# Patient Record
Sex: MALE | Race: WHITE | Employment: FULL TIME | URBAN - METROPOLITAN AREA
[De-identification: names, ages, dates, MRNs, and addresses within clinical notes are randomized per-mention and may not be internally consistent; named-entity substitution may affect disease eponyms.]

---

## 2018-01-03 ENCOUNTER — OFFICE VISIT (OUTPATIENT)
Dept: ORTHOPEDIC SURGERY | Age: 63
End: 2018-01-03

## 2018-01-03 VITALS — RESPIRATION RATE: 16 BRPM | HEIGHT: 68 IN | BODY MASS INDEX: 27.13 KG/M2 | WEIGHT: 179 LBS

## 2018-01-03 DIAGNOSIS — R52 PAIN: ICD-10-CM

## 2018-01-03 DIAGNOSIS — Z96.641 STATUS POST TOTAL REPLACEMENT OF RIGHT HIP: ICD-10-CM

## 2018-01-03 DIAGNOSIS — Z96.642 STATUS POST TOTAL REPLACEMENT OF LEFT HIP: ICD-10-CM

## 2018-01-03 DIAGNOSIS — Z96.643 STATUS POST TOTAL REPLACEMENT OF BOTH HIPS: Primary | ICD-10-CM

## 2018-01-03 PROBLEM — M19.90 ARTHRITIS: Status: ACTIVE | Noted: 2018-01-03

## 2018-01-03 PROCEDURE — G8427 DOCREV CUR MEDS BY ELIG CLIN: HCPCS | Performed by: ORTHOPAEDIC SURGERY

## 2018-01-03 PROCEDURE — 1036F TOBACCO NON-USER: CPT | Performed by: ORTHOPAEDIC SURGERY

## 2018-01-03 PROCEDURE — G8419 CALC BMI OUT NRM PARAM NOF/U: HCPCS | Performed by: ORTHOPAEDIC SURGERY

## 2018-01-03 PROCEDURE — G8484 FLU IMMUNIZE NO ADMIN: HCPCS | Performed by: ORTHOPAEDIC SURGERY

## 2018-01-03 PROCEDURE — 3017F COLORECTAL CA SCREEN DOC REV: CPT | Performed by: ORTHOPAEDIC SURGERY

## 2018-01-03 PROCEDURE — 99213 OFFICE O/P EST LOW 20 MIN: CPT | Performed by: ORTHOPAEDIC SURGERY

## 2018-01-03 NOTE — PROGRESS NOTES
Passive Hip Internal Rotation:  []AROM = PROM   Passive Hip External Rotation:  []AROM = PROM     Motor Function:  [x] No gross motor weakness of hip [x] No gross motor weakness of knee  [x] No gross motor weakness of ankle    [x] No gross motor weakness of great toe  [] Motor weakness:     Neurologic:  [x] Sensation to light touch intact. [] Impaired:  [x] Deep tendon reflexes intact. [] Impaired:  [x] Coordination / proprioception intact. [] Impaired:     Palpation: (Not tested if not marked)     Normal Tenderness Swelling Crepitation Calor   Greater Trochanter: [x] [] [] [] []   Piriformis: [x] [] [] [] []   Sacro-Iliac Joint: [x] [] [] [] []   ASIS [x] [] [] [] []   Ischial Tuberosity [x] [] [] [] []   Proximal Quadriceps: [x] [] [] [] []   Symphysis Pubis: [x] [] [] [] []   Other: [] [] [] [] []     Comment:     Provocative Tests: (Not tested if not marked)   Negative Positive Positive Findings   Straight Leg Raise: [x] []    Stinchfield Test: [x] []    [place] Test: [x] []    Piriformis Test: [x] []    Bobby Test: [x] []    BRE: [x] []    FADIR: [x] []    Russell Test: [x] []    90-90 St. Leg (Hamstrings): [x] []    Other: [] []    Other: [] []        MEDICAL DATA:     Imaging:  Hip x-ray:  2 view(s) of the right hip were obtained and reviewed and show a total hip arthroplasty in acceptable alignment without signs of hardware complication. No associated fractures, dislocations, or subluxations. Pelvis x-ray:  AP view of the pelvis is obtained and reviewed and show bilateral total hip arthroplasty in acceptable alignment without signs of hardware complication. No associated fractures, dislocations, or subluxations. Leg lengths are equal as measured at the level of the lesser trochanter. Hip x-ray:  2 view(s) of the left hip were obtained and reviewed and show a total hip arthroplasty in acceptable alignment without signs of hardware complication.   No associated fractures, dislocations, or

## 2018-01-03 NOTE — PATIENT INSTRUCTIONS
Apply heat as needed   Work on home stretching   Follow up in 2 years for bilateral RENE check with x-rays

## 2018-06-13 ENCOUNTER — OFFICE VISIT (OUTPATIENT)
Dept: FAMILY MEDICINE CLINIC | Age: 63
End: 2018-06-13

## 2018-06-13 VITALS
OXYGEN SATURATION: 93 % | SYSTOLIC BLOOD PRESSURE: 118 MMHG | BODY MASS INDEX: 26.66 KG/M2 | HEART RATE: 73 BPM | DIASTOLIC BLOOD PRESSURE: 60 MMHG | WEIGHT: 180 LBS | HEIGHT: 69 IN

## 2018-06-13 DIAGNOSIS — K21.9 GASTROESOPHAGEAL REFLUX DISEASE WITHOUT ESOPHAGITIS: Primary | ICD-10-CM

## 2018-06-13 DIAGNOSIS — Z96.643 STATUS POST TOTAL REPLACEMENT OF BOTH HIPS: ICD-10-CM

## 2018-06-13 DIAGNOSIS — M54.41 ACUTE LOW BACK PAIN WITH RIGHT-SIDED SCIATICA, UNSPECIFIED BACK PAIN LATERALITY: ICD-10-CM

## 2018-06-13 DIAGNOSIS — R05.9 COUGH: ICD-10-CM

## 2018-06-13 DIAGNOSIS — Z12.11 COLON CANCER SCREENING: ICD-10-CM

## 2018-06-13 DIAGNOSIS — R06.02 SHORTNESS OF BREATH: ICD-10-CM

## 2018-06-13 PROCEDURE — G8419 CALC BMI OUT NRM PARAM NOF/U: HCPCS | Performed by: FAMILY MEDICINE

## 2018-06-13 PROCEDURE — 1036F TOBACCO NON-USER: CPT | Performed by: FAMILY MEDICINE

## 2018-06-13 PROCEDURE — 99204 OFFICE O/P NEW MOD 45 MIN: CPT | Performed by: FAMILY MEDICINE

## 2018-06-13 PROCEDURE — G8427 DOCREV CUR MEDS BY ELIG CLIN: HCPCS | Performed by: FAMILY MEDICINE

## 2018-06-13 PROCEDURE — 3017F COLORECTAL CA SCREEN DOC REV: CPT | Performed by: FAMILY MEDICINE

## 2018-06-13 RX ORDER — NAPROXEN 500 MG/1
500 TABLET ORAL 2 TIMES DAILY WITH MEALS
Qty: 40 TABLET | Refills: 0 | Status: ON HOLD | OUTPATIENT
Start: 2018-06-13 | End: 2018-06-30 | Stop reason: HOSPADM

## 2018-06-13 RX ORDER — TIZANIDINE 4 MG/1
4 TABLET ORAL 2 TIMES DAILY PRN
Qty: 60 TABLET | Refills: 0 | Status: SHIPPED | OUTPATIENT
Start: 2018-06-13

## 2018-06-13 ASSESSMENT — ENCOUNTER SYMPTOMS
ABDOMINAL PAIN: 0
BACK PAIN: 1
BOWEL INCONTINENCE: 0

## 2018-06-13 ASSESSMENT — PATIENT HEALTH QUESTIONNAIRE - PHQ9
1. LITTLE INTEREST OR PLEASURE IN DOING THINGS: 0
SUM OF ALL RESPONSES TO PHQ9 QUESTIONS 1 & 2: 0
SUM OF ALL RESPONSES TO PHQ QUESTIONS 1-9: 0
2. FEELING DOWN, DEPRESSED OR HOPELESS: 0

## 2018-06-17 ASSESSMENT — ENCOUNTER SYMPTOMS
STRIDOR: 0
SINUS PRESSURE: 0
TROUBLE SWALLOWING: 0
COUGH: 0
WHEEZING: 0
RHINORRHEA: 0
NAUSEA: 0
VOMITING: 0
SHORTNESS OF BREATH: 0
APNEA: 0
DIARRHEA: 0
BLOOD IN STOOL: 0
SORE THROAT: 0
CONSTIPATION: 0

## 2018-06-18 ENCOUNTER — HOSPITAL ENCOUNTER (OUTPATIENT)
Dept: PULMONOLOGY | Age: 63
Discharge: HOME | End: 2018-06-18
Attending: FAMILY MEDICINE | Admitting: FAMILY MEDICINE

## 2018-06-25 ENCOUNTER — HOSPITAL ENCOUNTER (OUTPATIENT)
Dept: PHYSICAL THERAPY | Age: 63
Discharge: HOME | End: 2018-06-30
Attending: FAMILY MEDICINE | Admitting: FAMILY MEDICINE

## 2018-06-25 NOTE — PLAN OF CARE
Outpatient Physical Therapy        [x] Phone: [phone]   Fax: [phone]   Pediatric Therapy          [] Phone: [phone]   Fax: [phone]  [facility]          [] Phone: [phone]   Fax: [phone]      To: Referring Practitioner: Meredith Meigs, MD  From: Vanessa Candelario, GASTON     Patient: Jeronimo Vasquez       : 1955  Diagnosis: Acute LBP with right sciatica   Treatment Diagnosis: Muscular weakness, decreased function, pain   Date: 2018    Physical Therapy Certification/Re-Certification Form  Dear Dima Kirkland MD  The following patient has been evaluated for physical therapy services and for therapy to continue, Please review the attached evaluation and/or summary of the patient's plan of care, and verify that you agree therapy should continue by signing the attached document and sending it back to our office.     Fabien Cornejo is a  64 yo Male who presents with Acute LBP with right sciatica which impacts on gait, endurance, standing, sitting;patient's goal is to reduce back pain and improve function; PT to address patient's goals, impairments and activity limitations with skilled interventions checked in plan of care;patient's level of function prior to onset of Acute LBP with right sciatica was [facility]; did not observe any barriers to learning during PT eval; learning preferences include demonstration, practice, and handouts; patient expressed understanding of HEP; patient appears to be motivated to participate in an active PT program and to be compliant with HEP expectations;patient assisted in developing treatment plan and goals; no DME is currently being used      Planned Services:  [x] Therapeutic Exercise    [] Aquatics:  [x] Therapeutic Activity    [] Ultrasound  [x] Elec Stimulation  [x] Gait Training     [] Cervical Traction [] Lumbar Traction  [x] Neuromuscular Re-education [x] Cold/hotpack [] Iontophoresis   [x] Instruction in HEP       [x] Manual Therapy     []

## 2018-06-25 NOTE — PROGRESS NOTES
Physical Therapy  Initial Assessment  Date: 2018  Patient Name: Tasha Alvarez  MRN: [id number]  : 1955     Treatment Diagnosis: Muscular weakness, decreased function, pain    Restrictions   [facility]    Subjective   General  Chart Reviewed: Yes  Patient assessed for rehabilitation services?: Yes  Family / Caregiver Present: No  Referring Practitioner: Cyrus Hewitt MD  Referral Date : 18  Diagnosis: Acute LBP with right sciatica  Follows Commands: Within Functional Limits  PT Visit Information  PT Insurance Information: [address]  Subjective  Subjective: Pt states his back pain has been ongoing for about 1 year. Pt states it has been feeling like it gets pinched or twisted ever since and he was recommended to a chiropractor but his insurance doesn't cover it. Pt states he has been having increasing pain in the low back to daily. It is dependent on his activities. Pt is not currently having pain. Vision/Hearing  Vision  Vision: Impaired (wears glasses)  Hearing  Hearing: Exceptions to [facility]  Hearing Exceptions: Hard of hearing/hearing concerns    Orientation  Orientation  Overall Orientation Status: Within Normal Limits    Social/Functional History  Social/Functional History  Active : Yes  Mode of Transportation: Truck  Occupation: Unemployed  Type of occupation: Pt has been disabled a lot lately due to his hips, low back problems and breathing issues. Objective     Observation/Palpation  Posture: Fair  Palpation: Tenderness noted in the mendoza lumbar paraspinals, PSIS regions (all right > left)    Spine  Lumbar: Flexion 61 Extension 25 SB L 25 R 25    Strength RLE  Comment: hip flex 4-/5 abd 3/5 add 3/5; knee flex 3/5 ext 4/5; Ankle DF 5/5  Strength LLE  Comment: hip flex 4-/5 abd 3/5 add 3/5; knee flex 3/5 ext 4/5;  Ankle DF 5/5     Additional Measures  Flexibility: HS flexibility is WFL                    Assessment  Patient is a  66 yo Male who presents with Acute LBP with right sciatica which impacts on gait, endurance, standing, sitting;patient's goal is to reduce back pain and improve function; PT to address patient's goals, impairments and activity limitations with skilled interventions checked in plan of care;patient's level of function prior to onset of Acute LBP with right sciatica was [facility]; did not observe any barriers to learning during PT eval; learning preferences include demonstration, practice, and handouts; patient expressed understanding of HEP; patient appears to be motivated to participate in an active PT program and to be compliant with HEP expectations;patient assisted in developing treatment plan and goals; no DME is currently being used;      Current functional level (based on Oswestry page 1) 9 out of 25/36%    Conditions Requiring Skilled Therapeutic Intervention  Treatment Diagnosis: Muscular weakness, decreased function, pain  Prognosis: Fair  Decision Making: Medium Complexity  Exam: ROM, strength, palpation, Oswestry  Clinical Presentation: evolving  Patient Education: HEP  Barriers to Learning: None noted  REQUIRES PT FOLLOW UP: Yes  Treatment Initiated : yes         Plan   Plan  Times per week: 2x's/wk  Plan weeks: 5 weeks  Current Treatment Recommendations: Strengthening, ROM, Endurance Training, Manual Therapy - Soft Tissue Mobilization, Neuromuscular Re-education, Pain Management, Patient/Caregiver Education & Training, Safety Education & Training, Home Exercise Program, Modalities    G-Code  PT G-Codes  Functional Assessment Tool Used: Oswestry  Score: 9 out of 25/36%  Functional Limitation: Mobility: Walking and moving around  Mobility: Walking and Moving Around Current Status (): At least 20 percent but less than 40 percent impaired, limited or restricted  Mobility: Walking and Moving Around Goal Status (): At least 1 percent but less than 20 percent impaired, limited or restricted      Goals  Long term goals  Time Frame for Long term goals :  All

## 2018-06-28 PROBLEM — S82.202A: Status: ACTIVE | Noted: 2018-06-28

## 2018-06-29 PROBLEM — S82.232B: Status: ACTIVE | Noted: 2018-06-28

## 2018-06-30 PROBLEM — M19.90 ARTHRITIS: Status: RESOLVED | Noted: 2018-01-03 | Resolved: 2018-06-30

## 2018-07-01 ENCOUNTER — HOSPITAL ENCOUNTER (OUTPATIENT)
Dept: OTHER | Age: 63
Discharge: HOME | End: 2018-07-31
Attending: FAMILY MEDICINE | Admitting: FAMILY MEDICINE

## 2018-07-01 ENCOUNTER — CARE COORDINATION (OUTPATIENT)
Dept: CASE MANAGEMENT | Age: 63
End: 2018-07-01

## 2018-07-01 DIAGNOSIS — S82.232B TYPE I OR II OPEN DISPLACED OBLIQUE FRACTURE OF SHAFT OF LEFT TIBIA, INITIAL ENCOUNTER: Primary | ICD-10-CM

## 2018-07-01 PROCEDURE — 1111F DSCHRG MED/CURRENT MED MERGE: CPT | Performed by: FAMILY MEDICINE

## 2018-07-01 NOTE — CARE COORDINATION
[facility] Transitions Initial Follow Up Call    Call within 2 business days of discharge: Yes    Patient: Jose Ornelas Patient : 1955   MRN: <[id number]>  Reason for Admission: L tibial Fx-open  Discharge Date: 18 RARS: Readmission Risk Score: 9     Spoke with: [facility],[address]: [place]    Non-face-to-face services provided:  Obtained and reviewed discharge summary and/or continuity of care documents  Education of patient/family/caregiver/guardian to support self-management-Pain mgmt-meds,ice, elevate  Assessment and support for treatment adherence and medication management-med rec 1111F completed    Care Transitions 24 Hour Call    Do you have any ongoing symptoms?:  Yes  Patient-reported symptoms:  Pain (Comment: .-9/10)  Interventions for patient-reported symptoms:  Other (Comment: pt calling Dr Madyson Plascencia tomorrow for f/u and to discuss pain.)  Do you have a copy of your discharge instructions?:  Yes  Do you have all of your prescriptions and are they filled?:  Yes  Have you been contacted by a [facility] Pharmacist?:  No  Have you scheduled your follow up appointment?:  No (Comment: Pt calling Dr Madyson Plascencia tomorrow Monday for f/u appt)  Were you discharged with any Home Care or Post Acute Services:  No  Care Transitions Interventions     Care Transition post hospital f/u call to pt. He said he is having \"alot of pain in his L leg mostly at the knee- 8.-9/10. Taking Tramadol and tylenol as ordered. Discuss elevation and ice and he is doing that. States not much swelling. Review  mg 2 times day to prevent blood clots and he is taking. Med rec 9646I completed without discrepancy. Pt plans to call Dr Madyson Plascencia tomorrow as instructed to setup appt. He plans to discuss pain management also. Pt has PT visits noted on appt schedule but he said that was for his back and he will cancel those.      Follow Up  Future Appointments  Date Time Provider Rosemary Soares   7/3/2018 4:15 PM Imtiaz Serna

## 2018-07-02 ENCOUNTER — CARE COORDINATION (OUTPATIENT)
Dept: CASE MANAGEMENT | Age: 63
End: 2018-07-02

## 2018-07-02 ENCOUNTER — OFFICE VISIT (OUTPATIENT)
Dept: ORTHOPEDIC SURGERY | Age: 63
End: 2018-07-02

## 2018-07-02 VITALS — BODY MASS INDEX: 27.25 KG/M2 | RESPIRATION RATE: 16 BRPM | WEIGHT: 184 LBS | HEIGHT: 69 IN

## 2018-07-02 DIAGNOSIS — S82.232B TYPE I OR II OPEN DISPLACED OBLIQUE FRACTURE OF SHAFT OF LEFT TIBIA, INITIAL ENCOUNTER: Primary | ICD-10-CM

## 2018-07-02 PROCEDURE — 99024 POSTOP FOLLOW-UP VISIT: CPT | Performed by: ORTHOPAEDIC SURGERY

## 2018-07-02 NOTE — CARE COORDINATION
[facility] Transitions Follow Up Call    2018    Patient: Gustavo Rosa  Patient : 1955   MRN: [id number]  Reason for Admission: There are no discharge diagnoses documented for the most recent discharge. Discharge Date: 18 RARS: Readmission Risk Score: 9       Spoke with:  patient    Care Transitions Subsequent and Final Call    Subsequent and Final Calls  Care Transitions Interventions  Other Interventions: Follow Up:  Spoke with patient. Reports that he is still having pain. Denies fever, but reports some swelling to incision site. Patient reports that he is currently in the waiting room for follow up with his surgeon. Encouraged patient to inform physician of pain status. Patient voiced consent. Denies any needs at present. Agreeable to continued Care Transition.    Future Appointments  Date Time Provider Rosemary Soares   2018 3:00 PM Andressa Esquivel DO [facility]       Karthik Delgado RN

## 2018-07-02 NOTE — PROGRESS NOTES
Scribe Authentication Statement  Alexi Tabares scribed portions of this documentation for and in the presence of Dr. Aurea Sandoval DO on 7/2/18 at 3:35 PM.  Provider Authentication Statement:  I, Alexi Cheatham DO, personally performed the services described in this documentation and they were scribed in my presence by the above listed scribe. The documentation is both accurate and complete. ORTHOPEDIC FOLLOW UP AFTER SURGERY    HISTORY OF PRESENT ILLNESS (HPI):   Amador Person is a 65y.o. year old male who is here for follow up of:  1. Type I or II open displaced oblique fracture of shaft of left tibia, initial encounter        Procedure Name: OPERATION PERFORMED:  1. Irrigation and debridement of open left tibia shaft fracture including  skin and subcutaneous tissue (70459). 2.  Open treatment of the left tibial shaft fracture with intramedullary  nail implant (63281). Surgery Date: June/29/2018   Post-Op Number: 3 day(s)   How has the problem changed since the last visit: gradually improving   How have the associated manifestations changed since the last visit: stiffness and swelling  waxing and waning   New associated manifestations are: none   Adverse effects or complications: none   Other Comments: Patient is here for a dressing change and wound check after his impatient stay and surgery. Current Outpatient Prescriptions   Medication Sig Dispense Refill    traMADol (ULTRAM) 50 MG tablet Take 1 tablet by mouth every 6 hours for 7 days. . 28 tablet 0    acetaminophen (TYLENOL) 500 MG tablet Take 2 tablets by mouth every 8 hours for 7 days 42 tablet 0    aspirin 325 MG EC tablet Take 1 tablet by mouth 2 times daily for 21 days 42 tablet 0    Multiple Vitamins-Minerals (THERAPEUTIC MULTIVITAMIN-MINERALS) tablet Take 1 tablet by mouth daily      VENTOLIN  (90 Base) MCG/ACT inhaler Inhale 2 puffs into the lungs 2 times daily       tiZANidine (ZANAFLEX) 4 MG tablet Take 1 tablet by mouth 2 times daily as needed (spasm) 60 tablet 0    famotidine (PEPCID) 20 MG tablet Take 20 mg by mouth 2 times daily Over The Counter      aspirin 325 MG tablet Take 1 tablet by mouth 2 times daily for 18 days 36 tablet 0    aspirin 325 MG EC tablet Take 1 tablet by mouth 2 times daily for 19 days 38 tablet 0     No current facility-administered medications for this visit. ORTHOPEDIC EXAM:     [] Left Upper Extremity [] Right Upper Extremity  [x] Left Lower Extremity [] Right Lower Extremity    Inspection:  Incision: [x]Healing well without significant drainage, dehiscence, or significant erythema. []Erythema greater than expected:  []Dehiscence:  []Drainage:   Circulation: [x] Warm, well perfused, good capillary refill  [] Cool  [] Sluggish cap refill  [] Other:   Skin: [x] Intact without discoloration  [] Pale  [] Erythematous  [] Ecchymotic   [] Maceration  [] Other:  [] Wound:   Cast/Splint: [x] None  [] Intact, dry, and functional without any unexpected wear  [] Poorly fitting  [] Wet  [] Foul smelling  [] Broken  [] Patient altered  [] Foreign body within  [] Soft  [] Other:     Palpation:  Tenderness: [] None  [x] No more than expected  [] Pertinent positives & negatives:   Swelling: [] None  [x] No more than expected  [] Pertinent positives & negatives:   Calor (Heat) [x] None  [] No more than expected  [] Location:   Other Findings: -     Range of Motion:  [x] ROM deferred due to recent surgery   Active ROM: [] Full functional  [] Measured =   [] Mildly limited  [] Moderately limited  [] Severely limited   Passive ROM: [] Full functional  [] Measured =  [] Mildly limited  [] Moderately limited  [] Severely limited     Motor Function:  [x]Intact. No focal motor deficits. []Impaired:       Sensation:  Sensation to light touch: [x] Intact  [] Diminished:  [] Absent:       MEDICAL DATA:   Imaging:  No x-rays taken in the office today. ASSESSMENT / PLAN:      Diagnosis Orders   1.  Type I or II open displaced oblique fracture of shaft of left tibia, initial encounter         Continue non weight bearing left leg  Maintain dressings  Ice, elevate and rest  Follow up Friday        Electronically signed by Vinay Bustos on 7/2/2018 at 3:39 PM

## 2018-07-06 ENCOUNTER — CARE COORDINATION (OUTPATIENT)
Dept: CASE MANAGEMENT | Age: 63
End: 2018-07-06

## 2018-07-06 ENCOUNTER — OFFICE VISIT (OUTPATIENT)
Dept: ORTHOPEDIC SURGERY | Age: 63
End: 2018-07-06

## 2018-07-06 VITALS — HEART RATE: 88 BPM | OXYGEN SATURATION: 98 % | TEMPERATURE: 98.1 F

## 2018-07-06 DIAGNOSIS — Z09 POSTOP CHECK: ICD-10-CM

## 2018-07-06 DIAGNOSIS — S82.235B: ICD-10-CM

## 2018-07-06 DIAGNOSIS — S82.232B TYPE I OR II OPEN DISPLACED OBLIQUE FRACTURE OF SHAFT OF LEFT TIBIA, INITIAL ENCOUNTER: Primary | ICD-10-CM

## 2018-07-06 PROCEDURE — 99024 POSTOP FOLLOW-UP VISIT: CPT | Performed by: ORTHOPAEDIC SURGERY

## 2018-07-06 RX ORDER — TRAMADOL HYDROCHLORIDE 50 MG/1
50 TABLET ORAL EVERY 6 HOURS
Qty: 28 TABLET | Refills: 0 | Status: SHIPPED | OUTPATIENT
Start: 2018-07-06 | End: 2018-07-13 | Stop reason: SDUPTHER

## 2018-07-06 NOTE — PROGRESS NOTES
limited     Motor Function:  [x]Intact. No focal motor deficits. []Impaired:       Sensation:  Sensation to light touch: [x] Intact  [] Diminished:  [] Absent:       MEDICAL DATA:   Imaging:  No x-rays taken in the office today. ASSESSMENT / PLAN:      Diagnosis Orders   1. Type I or II open displaced oblique fracture of shaft of left tibia, initial encounter  XR TIBIA FIBULA LEFT (2 VIEWS)   2. Postop check       The increased swelling, erythema and slight serosanguinous drainage is likely from the patient leaving the limb in a dependant position and over activity. Localized wound care and strict elevation is emphasized.   New dressings applied  Maintain clean dry dressings, supplies provided   Keep incisions clean and dry  Continue non weight bearing left leg  Strict elevation of left leg above heart  Ice, elevate and rest  Ultram as needed, refilled  Follow up as scheduled 11 July for suture removal and xrays         Electronically signed by Vale Coffey MA on 7/6/2018 at 10:51 AM

## 2018-07-09 ENCOUNTER — TELEPHONE (OUTPATIENT)
Dept: ORTHOPEDIC SURGERY | Age: 63
End: 2018-07-09

## 2018-07-09 NOTE — TELEPHONE ENCOUNTER
I called him back. Advised him that he could leave open to air for 20 - 30 minutes during bandage changes. Also reminded him to keep the area elevated above the level of the heart at all practical times.

## 2018-07-11 ENCOUNTER — OFFICE VISIT (OUTPATIENT)
Dept: ORTHOPEDIC SURGERY | Age: 63
End: 2018-07-11

## 2018-07-11 DIAGNOSIS — S82.232B TYPE I OR II OPEN DISPLACED OBLIQUE FRACTURE OF SHAFT OF LEFT TIBIA, INITIAL ENCOUNTER: ICD-10-CM

## 2018-07-11 DIAGNOSIS — R52 PAIN: ICD-10-CM

## 2018-07-11 DIAGNOSIS — Z09 POSTOP CHECK: Primary | ICD-10-CM

## 2018-07-11 DIAGNOSIS — S82.235B: ICD-10-CM

## 2018-07-11 PROCEDURE — 99024 POSTOP FOLLOW-UP VISIT: CPT | Performed by: ORTHOPAEDIC SURGERY

## 2018-07-11 NOTE — PROGRESS NOTES
Scribe Authentication Statement  Rojas Love, scribed portions of this documentation for and in the presence of Dr. Nuzhat Nassar DO on 7/11/18 at 1:46 PM.  Provider Authentication Statement:  I, Ivy Kat DO, personally performed the services described in this documentation and they were scribed in my presence by the above listed scribe. The documentation is both accurate and complete. ORTHOPEDIC FOLLOW UP AFTER SURGERY    HISTORY OF PRESENT ILLNESS (HPI):   Yoselin Cochran is a 65y.o. year old male who is here for follow up of:  1. Postop check    2. Pain    3. Type I or II open displaced oblique fracture of shaft of left tibia, initial encounter    4. Type I or II open nondisplaced oblique fracture of shaft of left tibia, initial encounter        Procedure Name: OPERATION PERFORMED:  1. Irrigation and debridement of open left tibia shaft fracture including  skin and subcutaneous tissue (58329). 2.  Open treatment of the left tibial shaft fracture with intramedullary  nail implant (34445). Surgery Date: June/29/2018   Post-Op Number: 1.5 weeks    How has the problem changed since the last visit: In moderate pain today   How have the associated manifestations changed since the last visit: stiffness and swelling  waxing and waning   New associated manifestations are: Swelling    Adverse effects or complications: none   Other Comments: Patient is here for a dressing change and wound check. Current Outpatient Prescriptions   Medication Sig Dispense Refill    traMADol (ULTRAM) 50 MG tablet Take 1 tablet by mouth every 6 hours for 7 days. . 28 tablet 0    acetaminophen (TYLENOL) 500 MG tablet Take 2 tablets by mouth every 8 hours for 7 days 42 tablet 0    aspirin 325 MG EC tablet Take 1 tablet by mouth 2 times daily for 21 days 42 tablet 0    Multiple Vitamins-Minerals (THERAPEUTIC MULTIVITAMIN-MINERALS) tablet Take 1 tablet by mouth daily      VENTOLIN  (90 Base) MCG/ACT inhaler Inhale 2 puffs into the lungs 2 times daily       tiZANidine (ZANAFLEX) 4 MG tablet Take 1 tablet by mouth 2 times daily as needed (spasm) 60 tablet 0    aspirin 325 MG tablet Take 1 tablet by mouth 2 times daily for 18 days 36 tablet 0    aspirin 325 MG EC tablet Take 1 tablet by mouth 2 times daily for 19 days 38 tablet 0    famotidine (PEPCID) 20 MG tablet Take 20 mg by mouth 2 times daily Over The Counter       No current facility-administered medications for this visit. ORTHOPEDIC EXAM:     [] Left Upper Extremity [] Right Upper Extremity  [x] Left Lower Extremity [] Right Lower Extremity    Inspection:  Incision: [x]Healing well without significant drainage, dehiscence, or significant erythema. [x]Erythema greater than expected:  Mild erythema over open fracture site that improves with elevation of the limb above the level of the heart. Surgical wounds are without erythema. []Dehiscence:  []Drainage:  None. Circulation: [x] Warm, well perfused, good capillary refill  [] Cool  [] Sluggish cap refill  [] Other:   Skin: [x] Intact without discoloration  [] Pale  [] Erythematous  [] Ecchymotic   [] Maceration  [] Other:  [] Wound:   Cast/Splint: [x] None  [] Intact, dry, and functional without any unexpected wear  [] Poorly fitting  [] Wet  [] Foul smelling  [] Broken  [] Patient altered  [] Foreign body within  [] Soft  [] Other:     Palpation:  Tenderness: [] None  [x] No more than expected  [] Pertinent positives & negatives:   Swelling: [] None  [] No more than expected  [x] Pertinent positives & negatives:  Much improved edema in the foot, ankle, and lower leg.    Calor (Heat) [x] None  [] No more than expected  [] Location:   Other Findings: -     Range of Motion:  [x] ROM deferred due to recent surgery   Active ROM: [] Full functional  [] Measured =   [] Mildly limited  [] Moderately limited  [] Severely limited   Passive ROM: [] Full functional  [] Measured =  [] Mildly limited  [] Moderately limited  [] Severely limited     Motor Function:  [x]Intact. No focal motor deficits. []Impaired:       Sensation:  Sensation to light touch: [x] Intact  [] Diminished:  [] Absent:       MEDICAL DATA:   Imaging:  Tib-Fib x-ray:  2 view(s) of the left tibia and fibula were obtained and reviewed and show a nondisplaced non-angulated fracture of the distal shaft of the tibia. This fracture is at the junction of the middle and distal third. There is an intramedullary ina that is maintaining fracture alignment. No healing callus is visible at the fracture site. ASSESSMENT / PLAN:      Diagnosis Orders   1. Postop check     2. Pain  XR TIBIA FIBULA LEFT (2 VIEWS)   3. Type I or II open displaced oblique fracture of shaft of left tibia, initial encounter     4. Type I or II open nondisplaced oblique fracture of shaft of left tibia, initial encounter        Localized wound care and strict elevation is emphasized.   New dressings applied  Maintain clean dry dressings  Keep incisions clean and dry  Continue non weight bearing left leg  Strict elevation of left leg above heart  Ice and rest  Follow up Friday 13 July, in [place] for suture removal, no xrays at that visit         Electronically signed by Ligia Cruz DO on 7/11/2018 at 2:14 PM

## 2018-07-12 NOTE — PROGRESS NOTES
Scribe Authentication Statement  Jennie Leos, scribed portions of this documentation for and in the presence of Dr. Iris Nugent DO on 7/13/18 at 11:45 AM.    Provider Authentication Statement:  IDinora DO, personally performed the services described in this documentation and they were scribed in my presence by the above listed scribe. The documentation is both accurate and complete. ORTHOPEDIC FOLLOW UP AFTER SURGERY    HISTORY OF PRESENT ILLNESS (HPI):   Mary Beth Omalley is a 65y.o. year old male who is here for follow up of: Status Post IM Nail left tibia  1. Type I or II open nondisplaced oblique fracture of shaft of left tibia, initial encounter    2. Type I or II open displaced oblique fracture of shaft of left tibia, initial encounter        Procedure Name: IM nail of left tibia   Surgery Date: June/30/2018   Post-Op Number: 2 week(s)   How has the problem changed since the last visit: gradually improving   How have the associated manifestations changed since the last visit: . Juan Earing New associated manifestations are: none   Adverse effects or complications: None   Other Comments:       Current Outpatient Prescriptions   Medication Sig Dispense Refill    traMADol (ULTRAM) 50 MG tablet Take 1 tablet by mouth every 6 hours for 7 days. . 28 tablet 0    acetaminophen (TYLENOL) 500 MG tablet Take 2 tablets by mouth every 8 hours for 7 days 42 tablet 0    aspirin 325 MG EC tablet Take 1 tablet by mouth 2 times daily for 21 days 42 tablet 0    Multiple Vitamins-Minerals (THERAPEUTIC MULTIVITAMIN-MINERALS) tablet Take 1 tablet by mouth daily      VENTOLIN  (90 Base) MCG/ACT inhaler Inhale 2 puffs into the lungs 2 times daily       tiZANidine (ZANAFLEX) 4 MG tablet Take 1 tablet by mouth 2 times daily as needed (spasm) 60 tablet 0    aspirin 325 MG tablet Take 1 tablet by mouth 2 times daily for 18 days 36 tablet 0    aspirin 325 MG EC tablet Take 1 tablet by mouth 2 times daily for 19 days 38 encounter  traMADol (ULTRAM) 50 MG tablet   2. Type I or II open displaced oblique fracture of shaft of left tibia, initial encounter  traMADol (ULTRAM) 50 MG tablet     Patient will follow up in 2 weeks for post op with x-ray in [facility] office. Patient will call if there are any concerns.       Electronically signed by Cora Reid DO on 7/13/2018 at 11:46 AM

## 2018-07-13 ENCOUNTER — OFFICE VISIT (OUTPATIENT)
Dept: ORTHOPEDIC SURGERY | Age: 63
End: 2018-07-13

## 2018-07-13 DIAGNOSIS — S82.235B: ICD-10-CM

## 2018-07-13 DIAGNOSIS — S82.232B TYPE I OR II OPEN DISPLACED OBLIQUE FRACTURE OF SHAFT OF LEFT TIBIA, INITIAL ENCOUNTER: ICD-10-CM

## 2018-07-13 PROCEDURE — 99024 POSTOP FOLLOW-UP VISIT: CPT | Performed by: ORTHOPAEDIC SURGERY

## 2018-07-13 RX ORDER — TRAMADOL HYDROCHLORIDE 50 MG/1
50 TABLET ORAL EVERY 6 HOURS
Qty: 28 TABLET | Refills: 0 | Status: SHIPPED | OUTPATIENT
Start: 2018-07-13 | End: 2018-07-20

## 2018-07-30 ENCOUNTER — OFFICE VISIT (OUTPATIENT)
Dept: ORTHOPEDIC SURGERY | Age: 63
End: 2018-07-30

## 2018-07-30 DIAGNOSIS — S82.235B: ICD-10-CM

## 2018-07-30 DIAGNOSIS — Z09 POSTOP CHECK: Primary | ICD-10-CM

## 2018-07-30 DIAGNOSIS — S82.232B TYPE I OR II OPEN DISPLACED OBLIQUE FRACTURE OF SHAFT OF LEFT TIBIA, INITIAL ENCOUNTER: ICD-10-CM

## 2018-07-30 PROCEDURE — 99024 POSTOP FOLLOW-UP VISIT: CPT | Performed by: PHYSICIAN ASSISTANT

## 2018-07-30 NOTE — PROGRESS NOTES
Scribe Authentication Statement  Chino Bartholomew, scribed portions of this documentation for and in the presence of Sherlyn Knox PA-C on 7/30/18 at 1:36 PM.    Provider Authentication Statement  I, Sherlyn Knox PA-C, personally performed the services described in this documentation and they were scribed in my presence by the above listed scribe. The documentation is both accurate and complete. ORTHOPEDIC FOLLOW UP AFTER SURGERY    HISTORY OF PRESENT ILLNESS (HPI):   Teresa Wang is a 59y.o. year old male who is here for follow up of: Status Post IM Nail left tibia  1. Postop check    2. Type I or II open nondisplaced oblique fracture of shaft of left tibia, initial encounter    3.  Type I or II open displaced oblique fracture of shaft of left tibia, initial encounter        Procedure Name: IM nail of left tibia   Surgery Date: June/30/2018   Post-Op Number: 4 week(s)   How has the problem changed since the last visit: gradually improving   How have the associated manifestations changed since the last visit: .  .improving   New associated manifestations are: none   Adverse effects or complications: None   Other Comments:  is PWB with walker      Current Outpatient Prescriptions   Medication Sig Dispense Refill    acetaminophen (TYLENOL) 500 MG tablet Take 2 tablets by mouth every 8 hours for 7 days 42 tablet 0    aspirin 325 MG EC tablet Take 1 tablet by mouth 2 times daily for 21 days 42 tablet 0    Multiple Vitamins-Minerals (THERAPEUTIC MULTIVITAMIN-MINERALS) tablet Take 1 tablet by mouth daily      VENTOLIN  (90 Base) MCG/ACT inhaler Inhale 2 puffs into the lungs 2 times daily       tiZANidine (ZANAFLEX) 4 MG tablet Take 1 tablet by mouth 2 times daily as needed (spasm) 60 tablet 0    aspirin 325 MG tablet Take 1 tablet by mouth 2 times daily for 18 days 36 tablet 0    aspirin 325 MG EC tablet Take 1 tablet by mouth 2 times daily for 19 days 38 tablet 0    famotidine (PEPCID) 20 MG tablet Take

## 2018-07-30 NOTE — PATIENT INSTRUCTIONS
Continue PWB with walker   xrays ordered for imaging center   Start physical therapy   Elevate leg as much as possible   Transition to tylenol and NSAIDS for pain control   Follow up in 2 weeks     The patient was advised that NSAID-type medications have two very important potential side effects: gastrointestinal irritation including hemorrhage and renal injuries. He was asked to take the medication with food and to stop if he experiences any GI upset. I asked him to call for vomiting, abdominal pain or black/bloody stools. The patient expresses understanding of these issues and questions were answered.     Add Tylenol (also known as acetaminophen) as needed   Take 2 extra strength (500 mg) or 3 regular strength (325 mg) up to three times a day  It is OK to take Tylenol in conjunction with NSAID's (Advil, Aleve, Naprosyn, etc)  If taking other medications that have acetaminophen ensure total acetaminophen dose for any 24 period is less than 3,000 mg

## 2018-08-02 ENCOUNTER — HOSPITAL ENCOUNTER (OUTPATIENT)
Dept: PHYSICAL THERAPY | Age: 63
Discharge: HOME | End: 2018-08-31
Attending: PHYSICIAN ASSISTANT | Admitting: PHYSICIAN ASSISTANT

## 2018-08-02 ASSESSMENT — PAIN SCALES - GENERAL: PAINLEVEL_OUTOF10: 6

## 2018-08-02 ASSESSMENT — PAIN DESCRIPTION - LOCATION: LOCATION: LEG;TIBIA

## 2018-08-02 ASSESSMENT — PAIN DESCRIPTION - ORIENTATION: ORIENTATION: LEFT

## 2018-08-02 ASSESSMENT — PAIN DESCRIPTION - FREQUENCY: FREQUENCY: CONTINUOUS

## 2018-08-02 ASSESSMENT — PAIN DESCRIPTION - PAIN TYPE: TYPE: ACUTE PAIN;SURGICAL PAIN

## 2018-08-02 NOTE — FLOWSHEET NOTE
RE-ED                                     THER ACT                              GAIT                              MODALITIES                          HEP: As above, copy to pt/chart           Supervision/Cues:  Pt instructed to keep ex within tolerance. Objectives: See eval     Response to intervention:      Post Tx Pain Rating: Did not rate     Overall change since Evaluation:      Prognosis: FAIR     Plan for Next Session: Advance pt with ROM and strengthening as he tolerates.  (knee & ankle)       Plan:  _2_/week x _5_ weeks  [x] Continue per plan of care [] Alter current plan   [x] Plan of care initiated [] Hold pending MD visit [] Discharge    Intervention used today:  [x] Therapeutic Exercise    [x] Therapeutic Activity     [] Ultrasound  [] Elec  Stim  [] Gait Training      [] Cervical Traction [] Lumbar Traction  [] Neuromuscular Re-education    [] Cold/hotpack [] Iontophoresis   [x] Instruction in HEP      [] Vasopneumatic     [] Manual Therapy               [x] Self care home management                    (    ) Dry needling    Time In: 4453  Time Out: 1440  Timed Code Treatment Minutes: 23    Total Treatment Minutes: 54     Electronically signed by:  Johnsie Peabody 8/2/2018, 7:08 PM

## 2018-08-02 NOTE — PLAN OF CARE
Outpatient Physical Therapy        [x] Phone: [phone]   Fax: [phone]   Pediatric Therapy          [] Phone: [phone]   Fax: [phone]  [facility]          [] Phone: [phone]   Fax: [phone]      To: Referring Practitioner: Dennis Roberson PA-C  From: Marianne Early, PT     Patient: Lv Young       : 1955  Diagnosis: Left tibia nondisplaced Oblique Fracture   Treatment Diagnosis: Muscular weakness, difficulty walking, decreased function   Date: 2018    Physical Therapy Certification/Re-Certification Form  Dear Dr. Dennis Roberson PA-C  The following patient has been evaluated for physical therapy services and for therapy to continue, Please review the attached evaluation and/or summary of the patient's plan of care, and verify that you agree therapy should continue by signing the attached document and sending it back to our office.     Assessment: Patient is a 58 yo Male who presents with Left tibia nondisplaced Oblique Fracture which impacts on gait, endurance, movement, sleeping, standing, looking after his granddaughter;patient's goal is to increase ROM, strength and walk without walker; PT to address patient's goals, impairments and activity limitations with skilled interventions checked in plan of care;patient's level of function prior to onset of Left tibia nondisplaced Oblique Fracture was [facility]; did not observe any barriers to learning during PT eval; learning preferences include demonstration, practice, and handouts; patient expressed understanding of HEP; patient appears to be motivated to participate in an active PT program and to be compliant with HEP expectations;patient assisted in developing treatment plan and goals; Standard walker DME is currently being used      Planned Services:  [x] Therapeutic Exercise    [] Aquatics:  [x] Therapeutic Activity    [] Ultrasound  [x] Elec Stimulation  [x] Gait Training     [] Cervical Traction [] Lumbar Traction  [x] Neuromuscular Re-education [x] Cold/hotpack [] Iontophoresis   [x] Instruction in HEP       [x] Manual Therapy     [x] vasopneumatic            [x] Self care home management        []Dry needling trigger point point/pain management      Plan of Care Date Range:  8/2/2018 - 9/13/2018    ? Frequency/Duration:  # Days per week: [] 1 day # Weeks: [] 1 week [x] 5 weeks     [x] 2 days? [] 2 weeks [] 6 weeks     [] 3 days   [] 3 weeks [] 7 weeks     [] 4 days   [] 4 weeks [] 8 weeks    Rehab Potential: [] Excellent [] Good [x] Fair  [] Poor     Goals:     Long term goals  Time Frame for Long term goals : All  goals to be assessed by the 10th visit  Long term goal 1: Pt to be independent with HEP  Long term goal 2: Pt to increase left knee AROM  Long term goal 3: Pt to increase left knee strength  Long term goal 4: Pt to improve LEFS score    Electronically signed by:  Cherry Donato PT, 8/2/2018, 7:06 PM          If you have any questions or concerns, please don't hesitate to call.   Thank you for your referral.    Physician Signature:_________________Date:____________Time: ________  By signing above, therapists plan is approved by physician

## 2018-08-02 NOTE — PROGRESS NOTES
Physical Therapy  Initial Assessment  Date: 2018  Patient Name: Corie Contreras  MRN: [id number]  : 1955     Treatment Diagnosis: Muscular weakness, difficulty walking, decreased function    Restrictions   None noted    Subjective   General  Chart Reviewed: Yes  Patient assessed for rehabilitation services?: Yes  Family / Caregiver Present: No  Referring Practitioner: Vi Linder PA-C  Referral Date : 18  Diagnosis: Left tibia nondisplaced Oblique Fracture  PT Visit Information  Onset Date: 18  PT Insurance Information: [facility]  Subjective  Subjective: Pt states he was using a  and it blew up in front of him. Pt states there was a metal piece that flew off and hit him in the lower tibia causing a fracture. Pt was taken to ED and surgery was performed the next day. He has a ina down his entire left lower leg. He has 2 screws at the medial superior tibial and 2 screws at the inferior tibial, 1 longer incision longitudinal along the patella. Pt states the foot stays swollen and there is pain in the anterior lower leg to the point he can't touch it. Pt states he has been icing the knee frequently and trying to prop it up whenever he can. Pain Screening  Patient Currently in Pain: Yes  Pain Assessment  Pain Assessment: 0-10  Pain Level: 6  Pain Type: Acute pain;Surgical pain  Pain Location: Leg;Tibia  Pain Orientation: Left  Pain Descriptors: Sharp;Numbness; Aching; Throbbing  Pain Frequency: Continuous  Pain Intervention(s): Medication (see eMar); Cold applied  Vital Signs  Patient Currently in Pain: Yes    Vision/Hearing  Vision  Vision: Impaired (wears glasses)  Hearing  Hearing: Within functional limits    Orientation  Orientation  Overall Orientation Status: Within Normal Limits    Objective     Observation/Palpation  Posture: Fair  Palpation: Tenderness noted in entire left knee and entire anterior lower leg    AROM RLE (degrees)  RLE General AROM: Knee flex 120 ext 0

## 2018-08-13 ENCOUNTER — OFFICE VISIT (OUTPATIENT)
Dept: ORTHOPEDIC SURGERY | Age: 63
End: 2018-08-13

## 2018-08-13 DIAGNOSIS — Z09 POSTOP CHECK: Primary | ICD-10-CM

## 2018-08-13 DIAGNOSIS — S82.235B: ICD-10-CM

## 2018-08-13 DIAGNOSIS — R52 PAIN: ICD-10-CM

## 2018-08-13 PROCEDURE — 99024 POSTOP FOLLOW-UP VISIT: CPT | Performed by: PHYSICIAN ASSISTANT

## 2018-08-13 NOTE — PROGRESS NOTES
Scribe Authentication Statement  Gilberto Barr, scribed portions of this documentation for and in the presence of Deandra Olsen PA-C on 8/13/18 at 10:31 AM.  Provider Authentication Statement  I, Deandra Olsen PA-C, personally performed the services described in this documentation and they were scribed in my presence by the above listed scribe. The documentation is both accurate and complete. ORTHOPEDIC FOLLOW UP AFTER SURGERY    HISTORY OF PRESENT ILLNESS (HPI):   Mamadou Spears is a 59y.o. year old male who is here for follow up of: Status Post IM Nail left tibia  1. Postop check    2. Pain    3.  Type I or II open nondisplaced oblique fracture of shaft of left tibia, initial encounter        Procedure Name: IM nail of left tibia   Surgery Date: June/30/2018   Post-Op Number: 6 week(s)   How has the problem changed since the last visit: gradually improving   How have the associated manifestations changed since the last visit: .  .improving   New associated manifestations are: none   Adverse effects or complications: None   Other Comments:  is PWB with walker      Current Outpatient Prescriptions   Medication Sig Dispense Refill    acetaminophen (TYLENOL) 500 MG tablet Take 2 tablets by mouth every 8 hours for 7 days 42 tablet 0    aspirin 325 MG EC tablet Take 1 tablet by mouth 2 times daily for 21 days 42 tablet 0    Multiple Vitamins-Minerals (THERAPEUTIC MULTIVITAMIN-MINERALS) tablet Take 1 tablet by mouth daily      VENTOLIN  (90 Base) MCG/ACT inhaler Inhale 2 puffs into the lungs 2 times daily       tiZANidine (ZANAFLEX) 4 MG tablet Take 1 tablet by mouth 2 times daily as needed (spasm) 60 tablet 0    aspirin 325 MG tablet Take 1 tablet by mouth 2 times daily for 18 days 36 tablet 0    aspirin 325 MG EC tablet Take 1 tablet by mouth 2 times daily for 19 days 38 tablet 0    famotidine (PEPCID) 20 MG tablet Take 20 mg by mouth 2 times daily Over The Counter       No current facility-administered medications for this visit. ORTHOPEDIC EXAM:     [] Left Upper Extremity [] Right Upper Extremity  [x] Left Lower Extremity [] Right Lower Extremity    Inspection:  Incision: [x]Healing well without significant drainage, dehiscence, or significant erythema. []Erythema greater than expected:  []Dehiscence:  []Drainage:   Circulation: [x] Warm, well perfused, good capillary refill  [] Cool  [] Sluggish cap refill  [] Other:   Skin: [x] Intact without discoloration  [] Pale  [] Erythematous  [] Ecchymotic   [] Maceration  [] Other:  [] Wound:   Cast/Splint: [x] None  [] Intact, dry, and functional without any unexpected wear  [] Poorly fitting  [] Wet  [] Foul smelling  [] Broken  [] Patient altered  [] Foreign body within  [] Soft  [] Other:     Palpation:  Tenderness: [] None  [x] No more than expected  [] Pertinent positives & negatives: Pain over the medial malleolus and pain over the midshaft of the tibia    Swelling: [] None  [x] No more than expected  [] Pertinent positives & negatives:   Calor (Heat) [x] None  [] No more than expected  [] Location:   Other Findings: All incisions are healed and show no signs of infection. Range of Motion:  [] ROM deferred due to recent surgery   Active ROM: [] Full functional  [] Measured =   [] Mildly limited  [x] Moderately limited  [] Severely limited   Passive ROM: [] Full functional  [] Measured =  [] Mildly limited  [] Moderately limited  [] Severely limited     Motor Function:  [x]Intact. No focal motor deficits. []Impaired:       Sensation:  Sensation to light touch: [x] Intact  [] Diminished:  [] Absent:       MEDICAL DATA:   Imaging:  taken and reviewed  2 views of the left Tib-fib no new fractures or dislocations noted, stable fixation of a tibial shaft fracture with intramedullary nail fixation and no signs of complications with hardware.   The official read and interpretation of these x-rays will be done by the the [facility]     ASSESSMENT / PLAN:      Diagnosis Orders   1. Postop check     2.  Pain  XR TIBIA FIBULA LEFT (2 VIEWS)    XR TIBIA FIBULA LEFT (2 VIEWS)    XR TIBIA FIBULA LEFT (2 VIEWS)   3. Type I or II open nondisplaced oblique fracture of shaft of left tibia, initial encounter       Continue PWB with walker   Continue physical therapy   Elevate leg as much as possible   Transition to tylenol and NSAIDS for pain control   Follow up in 6 weeks with x-ray

## 2018-09-01 ENCOUNTER — HOSPITAL ENCOUNTER (OUTPATIENT)
Dept: OTHER | Age: 63
Discharge: HOME | End: 2018-09-01
Attending: PHYSICIAN ASSISTANT | Admitting: PHYSICIAN ASSISTANT

## 2018-09-24 ENCOUNTER — OFFICE VISIT (OUTPATIENT)
Dept: ORTHOPEDIC SURGERY | Age: 63
End: 2018-09-24
Payer: MEDICAID

## 2018-09-24 VITALS — WEIGHT: 184 LBS | HEIGHT: 69 IN | BODY MASS INDEX: 27.25 KG/M2 | RESPIRATION RATE: 16 BRPM

## 2018-09-24 DIAGNOSIS — R52 PAIN: ICD-10-CM

## 2018-09-24 DIAGNOSIS — Z09 POSTOP CHECK: ICD-10-CM

## 2018-09-24 DIAGNOSIS — S82.232B TYPE I OR II OPEN DISPLACED OBLIQUE FRACTURE OF SHAFT OF LEFT TIBIA, INITIAL ENCOUNTER: Primary | ICD-10-CM

## 2018-09-24 DIAGNOSIS — S82.235B: ICD-10-CM

## 2018-09-24 PROCEDURE — 73590 X-RAY EXAM OF LOWER LEG: CPT | Performed by: PHYSICIAN ASSISTANT

## 2018-09-24 PROCEDURE — 99024 POSTOP FOLLOW-UP VISIT: CPT | Performed by: PHYSICIAN ASSISTANT

## 2018-09-24 NOTE — LETTER
[facility]  [address] [place] [address]. [address] [place] [address]  Phone: [phone]  Fax: [phone]    Aleksandr Must        September 24, 2018     Patient: Valdez Schafer   YOB: 1955   Date of Visit: 9/24/2018       To Whom It May Concern: It is my medical opinion that Valdez Schafer requires a disability parking placard for the following reasons:  He cannot walk without assistance from another person or the use of an assistance device (cane, crutch, prosthetic device, wheelchair, etc.). Duration of need: 3 months    If you have any questions or concerns, please don't hesitate to call.     Sincerely,        Miguelito Hendrickson PA-C

## 2018-09-24 NOTE — PROGRESS NOTES
than expected:  []Dehiscence:  []Drainage:   Circulation: [x] Warm, well perfused, good capillary refill  [] Cool  [] Sluggish cap refill  [] Other:   Skin: [x] Intact without discoloration  [] Pale  [] Erythematous  [] Ecchymotic   [] Maceration  [] Other:  [] Wound:   Cast/Splint: [x] None  [] Intact, dry, and functional without any unexpected wear  [] Poorly fitting  [] Wet  [] Foul smelling  [] Broken  [] Patient altered  [] Foreign body within  [] Soft  [] Other:     Palpation:  Tenderness: [] None  [x] No more than expected  [] Pertinent positives & negatives: Pain over the medial malleolus and pain over the midshaft of the tibia    Swelling: [] None  [x] No more than expected  [] Pertinent positives & negatives:   Calor (Heat) [x] None  [] No more than expected  [] Location:   Other Findings: All incisions are healed and show no signs of infection. Range of Motion:  [] ROM deferred due to recent surgery   Active ROM: [] Full functional  [x] Measured = Ankle dorsiflexion 5°, plantar flexion 50°, eversion 10°, inversion 10°. [] Mildly limited  [] Moderately limited  [] Severely limited   Passive ROM: [] Full functional  [x] Measured =  [] Mildly limited  [] Moderately limited  [] Severely limited     Motor Function:  [x]Intact. No focal motor deficits. []Impaired:       Sensation:  Sensation to light touch: [x] Intact  [] Diminished:  [] Absent:       MEDICAL DATA:   Imaging:  taken and reviewed  2 views of the left Tib-fib no new fractures or dislocations noted, stable fixation of a tibial shaft fracture with intramedullary nail fixation and no signs of complications with hardware. There does appear to be some mild fracture callus formation however fracture line is still clearly visible. The official read and interpretation of these x-rays will be done by the the [facility]     ASSESSMENT / PLAN:      Diagnosis Orders   1.  Type I or II open displaced oblique fracture of shaft of left tibia, initial encounter     2. Pain  XR TIBIA FIBULA LEFT (2 VIEWS)   3. Postop check     4.  Type I or II open nondisplaced oblique fracture of shaft of left tibia, initial encounter       Continue therapy  Weight bearing as tolerated   Handicap placard provided, for 3 months   Follow up in 3 months with Dr Chris Gandhi with [facility]

## 2018-09-24 NOTE — PATIENT INSTRUCTIONS
Weight bearing as tolerated   Handicap placard provided   Follow up in 3 months with Dr Kaiden Street with [facility]

## 2018-09-26 ENCOUNTER — HOSPITAL ENCOUNTER (OUTPATIENT)
Dept: PHYSICAL THERAPY | Age: 63
Setting detail: THERAPIES SERIES
Discharge: HOME | End: 2018-09-26
Payer: MEDICAID

## 2018-09-26 PROCEDURE — 97110 THERAPEUTIC EXERCISES: CPT

## 2018-09-26 NOTE — FLOWSHEET NOTE
Physical Therapy Daily Treatment Note  Date:  2018    Patient Name:  Mary Beth Omalley    :  1955  MRN: [id number]      Diagnosis: Left tibia nondisplaced Oblique Fracture  Treatment Diagnosis: Muscular weakness, difficulty walking, decreased function  PT Insurance Information: [place] (30 pcy - used 3)  Referring Practitioner: Malinda Carrizales PA-C  Referring Practitioner Follow-Up:  Next doctor visit 18   POC Signed: pending  POC Date Range:  2018 - 10/30/2018  Progress Note Due:  10th visit  Visit# / total visits: 1/8  10/10                    Goals:     Long term goals  Time Frame for Long term goals : All  goals to be assessed by the 10th visit  Long term goal 1: Pt to be independent with HEP  Long term goal 2: Pt to increase left knee AROM  Long term goal 3: Pt to increase left knee strength  Long term goal 4: Pt to improve LEFS score       Summary of Eval:  Pt states he was using a  and it blew up in front of him. Pt states there was a metal piece that flew off and hit him in the lower tibia causing a fracture. Pt was taken to ED and surgery was performed the next day. He has a ina down his entire left lower leg. He has 2 screws at the medial superior tibial and 2 screws at the inferior tibial, 1 longer incision longitudinal along the patella. Pt states the foot stays swollen and there is pain in the anterior lower leg to the point he can't touch it. Pt states he has been icing the knee frequently and trying to prop it up whenever he can. INITIAL PAIN LEVEL: 3/10   SUBJECTIVE: pt reports slight pitting edema in L foot, but not painful. Pt reports sleeping better at night. Pain is slowley     Any changes to Ambulatory Summary Sheet? Any major status changes?      ACTIVITIES: 18 #5                             18 #6 18 #7 18 #8 9/10/18 #9 18 #10 18 #1    Outcomes measure: LEFS     LEFS Score 37/80  LEFS Score 37/80     THER EX           seated or supine heel slides (knee flexion)  2x10 reps 3x10 3x10 3x10 3x10 3x10 3x10     Quad sets 2x10 reps 2x10 reps 2 x10 reps 2x10 2x10 2x10 2x10   Ankle pumps/Circles cw/ccw x10 reps each x10 reps each 30 reps x30 reps x30 x30 x30     Hip abduction (supine) sidelying L 10 reps, R 15 reps x15 resp L/R x15 reps x15 reps  x15 x15    SAQ Med 2 x10 reps Med 3x10 reps Med 3x10 reps Med x10 reps Med 3 x10 reps Med 3x10 reps Med 3x10     Nu-Step Nu-step seat11 arms 11 lv2 10 min Nu-step seat 11 arms 11 lv3 10 min Nu-step seat 11 arms 11 lv3 10 min Nu-step seat 11 arms 11 lv3 10 min Nu-step seat 11 arms 11 lv3 10 min Nu-step seat11, arms 11 lv3 12 Nu-step seat 11 arms 11 lv4 15 min   SLR 2x10 3x10 3x10 3x10 3x10 1# wt at knee 3x10 1# wt at knee 3x10 1# at knee         Balloon volley ball on aeromat 40 tosses Balloon volley ball on blue oval 40 tosses Balloon volley ball on blue oval 40 tosses balloon volley ball on blue oval 40 tosses    MANUAL       Mini squats 2 x10 reps Min squats 2 x10 reps Min squats 2 x10 reps   NEURO RE-ED                                                         THER ACT                                                  GAIT  Gt with 4WW 200ft Gt with 4WW 230 ft x1 200 ft x1, trial with cane next treatment Gt with cane 300ft independently no LOB Gt with cane 280ft independently no LOB Gt with cane 270 ft independently Gt with cane 270 ft independently decreased L sided limp                                           MODALITIES                                          HEP:                      Monitoring to ensure safe and effective activity performance Visual target provided to facilitate successful completion of task Supervised pt through multiple LE str and ROM ex for rehab progression Supervised pt through multiple LE str and ROM ex for rehab progression Supervised pt through multiple LE str and ROM ex for rehab progression Supervised pt through multiple LE str and ROM ex as well as

## 2018-12-20 ENCOUNTER — OFFICE VISIT (OUTPATIENT)
Dept: ORTHOPEDIC SURGERY | Age: 63
End: 2018-12-20

## 2018-12-20 VITALS — HEIGHT: 69 IN | BODY MASS INDEX: 27.25 KG/M2 | WEIGHT: 184 LBS | RESPIRATION RATE: 16 BRPM

## 2018-12-20 DIAGNOSIS — R52 PAIN: Primary | ICD-10-CM

## 2018-12-20 DIAGNOSIS — Z98.890 S/P ORIF (OPEN REDUCTION INTERNAL FIXATION) FRACTURE: ICD-10-CM

## 2018-12-20 DIAGNOSIS — Z87.81 S/P ORIF (OPEN REDUCTION INTERNAL FIXATION) FRACTURE: ICD-10-CM

## 2018-12-20 PROCEDURE — 99024 POSTOP FOLLOW-UP VISIT: CPT | Performed by: ORTHOPAEDIC SURGERY

## 2022-01-03 ENCOUNTER — HOSPITAL ENCOUNTER (EMERGENCY)
Age: 67
Discharge: LEFT BEFORE DISCHARGE | End: 2022-01-03

## 2025-01-10 ENCOUNTER — OFFICE VISIT (OUTPATIENT)
Dept: FAMILY MEDICINE CLINIC | Age: 70
End: 2025-01-10

## 2025-01-10 VITALS
BODY MASS INDEX: 26.95 KG/M2 | SYSTOLIC BLOOD PRESSURE: 118 MMHG | TEMPERATURE: 97.7 F | OXYGEN SATURATION: 97 % | DIASTOLIC BLOOD PRESSURE: 74 MMHG | HEART RATE: 58 BPM | WEIGHT: 177.8 LBS | HEIGHT: 68 IN

## 2025-01-10 DIAGNOSIS — Z11.59 SCREENING FOR VIRAL DISEASE: ICD-10-CM

## 2025-01-10 DIAGNOSIS — Z87.891 FORMER SMOKER: ICD-10-CM

## 2025-01-10 DIAGNOSIS — Z13.6 SCREENING FOR ISCHEMIC HEART DISEASE: ICD-10-CM

## 2025-01-10 DIAGNOSIS — R19.7 DIARRHEA, UNSPECIFIED TYPE: ICD-10-CM

## 2025-01-10 DIAGNOSIS — M79.89 FOOT SWELLING: ICD-10-CM

## 2025-01-10 DIAGNOSIS — Z00.00 INITIAL MEDICARE ANNUAL WELLNESS VISIT: Primary | ICD-10-CM

## 2025-01-10 RX ORDER — LOPERAMIDE HYDROCHLORIDE 2 MG/1
2 CAPSULE ORAL 4 TIMES DAILY PRN
Qty: 30 CAPSULE | Refills: 0 | Status: SHIPPED | OUTPATIENT
Start: 2025-01-10 | End: 2025-01-20

## 2025-01-10 SDOH — ECONOMIC STABILITY: FOOD INSECURITY: WITHIN THE PAST 12 MONTHS, THE FOOD YOU BOUGHT JUST DIDN'T LAST AND YOU DIDN'T HAVE MONEY TO GET MORE.: NEVER TRUE

## 2025-01-10 SDOH — ECONOMIC STABILITY: FOOD INSECURITY: WITHIN THE PAST 12 MONTHS, YOU WORRIED THAT YOUR FOOD WOULD RUN OUT BEFORE YOU GOT MONEY TO BUY MORE.: NEVER TRUE

## 2025-01-10 ASSESSMENT — ENCOUNTER SYMPTOMS
COUGH: 0
SHORTNESS OF BREATH: 0
NAUSEA: 0
ABDOMINAL PAIN: 0
DIARRHEA: 1
VOMITING: 0
BLOOD IN STOOL: 0

## 2025-01-10 ASSESSMENT — PATIENT HEALTH QUESTIONNAIRE - PHQ9
2. FEELING DOWN, DEPRESSED OR HOPELESS: NOT AT ALL
SUM OF ALL RESPONSES TO PHQ QUESTIONS 1-9: 0
1. LITTLE INTEREST OR PLEASURE IN DOING THINGS: NOT AT ALL
SUM OF ALL RESPONSES TO PHQ QUESTIONS 1-9: 0
SUM OF ALL RESPONSES TO PHQ QUESTIONS 1-9: 0
SUM OF ALL RESPONSES TO PHQ9 QUESTIONS 1 & 2: 0
SUM OF ALL RESPONSES TO PHQ QUESTIONS 1-9: 0

## 2025-01-10 ASSESSMENT — LIFESTYLE VARIABLES
HOW MANY STANDARD DRINKS CONTAINING ALCOHOL DO YOU HAVE ON A TYPICAL DAY: 1 OR 2
HOW OFTEN DO YOU HAVE A DRINK CONTAINING ALCOHOL: 2-3 TIMES A WEEK

## 2025-01-10 ASSESSMENT — VISUAL ACUITY
OS_CC: 20/30-2
OD_CC: 20/30-1

## 2025-01-10 NOTE — PROGRESS NOTES
Venipuncture performed on patients left antecubital space using a straight needle to obtain 2 SST and one lavender. Patient tolerated well, no complications.   
AORTA LIMITED; Future  Screening for ischemic heart disease  -     Lipid Panel  Screening for viral disease  -     Hepatitis C Antibody  Diarrhea, unspecified type  Assessment & Plan:   I ordered stool tests and loperamide.  Also I will refer to gastroenterology.  Also checking CMP and CBC.  He likely needs colon cancer screening and a colonoscopy would be a good way to do both colon cancer screening and evaluate the diarrhea that has been chronic for 5 months.  Orders:  -     Comprehensive Metabolic Panel  -     CBC with Auto Differential  -     Ambulatory referral to Gastroenterology  -     OVA & PARASITE ID/COUNT #1; Future  -     GIARDIA ANTIGEN; Future  -     loperamide (IMODIUM) 2 MG capsule; Take 1 capsule by mouth 4 times daily as needed for Diarrhea, Disp-30 capsule, R-0Normal  Foot swelling  Assessment & Plan:   I gave reassurance for now.  Plan to continue to monitor.  Orders:  -     Uric Acid       No follow-ups on file.     Subjective   See note above.     Patient's complete Health Risk Assessment and screening values have been reviewed and are found in Flowsheets. The following problems were reviewed today and where indicated follow up appointments were made and/or referrals ordered.    Positive Risk Factor Screenings with Interventions:        Drug Use:   Substance and Sexual Activity   Drug Use Yes    Types: Marijuana (Weed)    Comment: \"Smoke Marijuana Maybe Every Other Day\"     Interventions:  Uses cannabis only.         General HRA Questions:  Select all that apply: (!) Stress  Interventions - Stress:  Stress about the diarrhea. See E/M note above.          Dentist Screen:  Have you seen the dentist within the past year?: (!) No    Intervention:  Patient declines any further evaluation or treatment    Hearing Screen:  Do you or your family notice any trouble with your hearing that hasn't been managed with hearing aids?: (!) Yes    Interventions:  Patient declines any further evaluation or

## 2025-01-10 NOTE — ASSESSMENT & PLAN NOTE
I ordered stool tests and loperamide.  Also I will refer to gastroenterology.  Also checking CMP and CBC.  He likely needs colon cancer screening and a colonoscopy would be a good way to do both colon cancer screening and evaluate the diarrhea that has been chronic for 5 months.

## 2025-01-11 LAB
ALBUMIN SERPL-MCNC: 3.9 G/DL (ref 3.4–5)
ALBUMIN/GLOB SERPL: 1.4 {RATIO} (ref 1.1–2.2)
ALP SERPL-CCNC: 106 U/L (ref 40–129)
ALT SERPL-CCNC: 14 U/L (ref 10–40)
ANION GAP SERPL CALCULATED.3IONS-SCNC: 12 MMOL/L (ref 3–16)
AST SERPL-CCNC: 16 U/L (ref 15–37)
BASOPHILS # BLD: 0.1 K/UL (ref 0–0.2)
BASOPHILS NFR BLD: 1 %
BILIRUB SERPL-MCNC: 0.3 MG/DL (ref 0–1)
BUN SERPL-MCNC: 17 MG/DL (ref 7–20)
CALCIUM SERPL-MCNC: 9.4 MG/DL (ref 8.3–10.6)
CHLORIDE SERPL-SCNC: 105 MMOL/L (ref 99–110)
CHOLEST SERPL-MCNC: 189 MG/DL (ref 0–199)
CO2 SERPL-SCNC: 26 MMOL/L (ref 21–32)
CREAT SERPL-MCNC: 0.7 MG/DL (ref 0.8–1.3)
DEPRECATED RDW RBC AUTO: 15.8 % (ref 12.4–15.4)
EOSINOPHIL # BLD: 0.8 K/UL (ref 0–0.6)
EOSINOPHIL NFR BLD: 9.8 %
GFR SERPLBLD CREATININE-BSD FMLA CKD-EPI: >90 ML/MIN/{1.73_M2}
GLUCOSE SERPL-MCNC: 88 MG/DL (ref 70–99)
HCT VFR BLD AUTO: 44.2 % (ref 40.5–52.5)
HCV AB SERPL QL IA: NORMAL
HDLC SERPL-MCNC: 53 MG/DL (ref 40–60)
HGB BLD-MCNC: 14.5 G/DL (ref 13.5–17.5)
LDLC SERPL CALC-MCNC: 115 MG/DL
LYMPHOCYTES # BLD: 2 K/UL (ref 1–5.1)
LYMPHOCYTES NFR BLD: 25.9 %
MCH RBC QN AUTO: 30.1 PG (ref 26–34)
MCHC RBC AUTO-ENTMCNC: 32.9 G/DL (ref 31–36)
MCV RBC AUTO: 91.4 FL (ref 80–100)
MONOCYTES # BLD: 0.6 K/UL (ref 0–1.3)
MONOCYTES NFR BLD: 7.5 %
NEUTROPHILS # BLD: 4.3 K/UL (ref 1.7–7.7)
NEUTROPHILS NFR BLD: 55.8 %
PLATELET # BLD AUTO: 381 K/UL (ref 135–450)
PMV BLD AUTO: 8.8 FL (ref 5–10.5)
POTASSIUM SERPL-SCNC: 5 MMOL/L (ref 3.5–5.1)
PROT SERPL-MCNC: 6.6 G/DL (ref 6.4–8.2)
RBC # BLD AUTO: 4.84 M/UL (ref 4.2–5.9)
SODIUM SERPL-SCNC: 143 MMOL/L (ref 136–145)
TRIGL SERPL-MCNC: 104 MG/DL (ref 0–150)
URATE SERPL-MCNC: 5.7 MG/DL (ref 3.5–7.2)
VLDLC SERPL CALC-MCNC: 21 MG/DL
WBC # BLD AUTO: 7.7 K/UL (ref 4–11)

## 2025-01-28 ENCOUNTER — PREP FOR PROCEDURE (OUTPATIENT)
Dept: GASTROENTEROLOGY | Age: 70
End: 2025-01-28

## 2025-01-28 ENCOUNTER — OFFICE VISIT (OUTPATIENT)
Dept: GASTROENTEROLOGY | Age: 70
End: 2025-01-28
Payer: MEDICARE

## 2025-01-28 VITALS
SYSTOLIC BLOOD PRESSURE: 134 MMHG | RESPIRATION RATE: 16 BRPM | DIASTOLIC BLOOD PRESSURE: 76 MMHG | WEIGHT: 181.2 LBS | HEART RATE: 72 BPM | OXYGEN SATURATION: 98 % | BODY MASS INDEX: 27.46 KG/M2 | HEIGHT: 68 IN

## 2025-01-28 DIAGNOSIS — K52.9 CHRONIC DIARRHEA: ICD-10-CM

## 2025-01-28 DIAGNOSIS — K52.9 CHRONIC DIARRHEA: Primary | ICD-10-CM

## 2025-01-28 PROCEDURE — 1159F MED LIST DOCD IN RCRD: CPT | Performed by: INTERNAL MEDICINE

## 2025-01-28 PROCEDURE — 1123F ACP DISCUSS/DSCN MKR DOCD: CPT | Performed by: INTERNAL MEDICINE

## 2025-01-28 PROCEDURE — 99204 OFFICE O/P NEW MOD 45 MIN: CPT | Performed by: INTERNAL MEDICINE

## 2025-01-28 RX ORDER — SODIUM CHLORIDE, SODIUM LACTATE, POTASSIUM CHLORIDE, CALCIUM CHLORIDE 600; 310; 30; 20 MG/100ML; MG/100ML; MG/100ML; MG/100ML
INJECTION, SOLUTION INTRAVENOUS CONTINUOUS
Status: CANCELLED | OUTPATIENT
Start: 2025-01-28

## 2025-01-28 RX ORDER — SODIUM CHLORIDE 0.9 % (FLUSH) 0.9 %
5-40 SYRINGE (ML) INJECTION PRN
Status: CANCELLED | OUTPATIENT
Start: 2025-01-28

## 2025-01-28 RX ORDER — SODIUM CHLORIDE 0.9 % (FLUSH) 0.9 %
5-40 SYRINGE (ML) INJECTION EVERY 12 HOURS SCHEDULED
Status: CANCELLED | OUTPATIENT
Start: 2025-01-28

## 2025-01-28 RX ORDER — SODIUM CHLORIDE 9 MG/ML
INJECTION, SOLUTION INTRAVENOUS PRN
Status: CANCELLED | OUTPATIENT
Start: 2025-01-28

## 2025-01-28 NOTE — PROGRESS NOTES
[facility] Gastroenterology and Hepatology      MD Andre Hooks MD Carol Christensen, APRN-CNP       Rosalina Powell, APRN-CNP             [address] [place] [address] [phone] fax [phone]        Gastroenterology Clinic Consultation    Hilton Ko MD  Encounter Date: 01/28/25     CC: New Patient (Diarrhea- been going on for about 6 months)       Seun Cedeno MD  [address] [place] [address]     History obtained from: patient and medical records     Subjective:     Tony Reis is an 69 y.o.  male who presents for New Patient (Diarrhea- been going on for about 6 months)  .     69-year-old pleasant male, presents for the evaluation of chronic diarrhea problem.  Apparently, the problem has been going on for the last 6 months or so.  He has diarrhea most of the time.  Sometimes he may have constipation not normal regular bowel movements.  He has diarrhea throughout the day and rarely at night as well.  He has anywhere between 10-13 loose to mushy bowel movements.  Stool is yellowish to brown in color.  No black stool.  No bleeding per rectum.  No significant abdominal pain.  He had stated that he likes to eat a lot and eats most of the time and often times, diarrhea follows after eating food.  He does have some sense of urgency and very rare episodes of incontinence.  For the most part he has control over the bowel movements.  No symptoms of abdominal pain, postprandial upper abdominal discomfort, nausea, vomiting.  He has known history of GERD.  No dysphagia.  He has a normal appetite and no history of loss of weight.  On the review of labs, electrolyte values are normal serum BUN and creatinine are normal.  CBC is normal.  No evidence of anemia, any suggestion of iron deficiency.  He takes Zegerid for the control of GERD problem.  He is not on any prescription medications.  No relevant history of

## 2025-01-29 ENCOUNTER — TELEPHONE (OUTPATIENT)
Dept: FAMILY MEDICINE CLINIC | Age: 70
End: 2025-01-29

## 2025-01-29 DIAGNOSIS — Z87.891 FORMER SMOKER: Primary | ICD-10-CM

## 2025-01-29 NOTE — TELEPHONE ENCOUNTER
Scheduling called stated the US abdominal aorta limited needs changed to Vascular V triple A screening.   procedure code-[id number]    Please change

## 2025-02-21 NOTE — PROGRESS NOTES
Spoke with patient and he will arrive at 0930 at [facility] on 3/5/2025 for his procedure at 1100.    NOTHING TO EAT OR DRINK AFTER MIDNIGHT DAY OF SURGERY    1. Enter thru the hospital main entrance on day of surgery, check in at the Information Desk. If you arrive prior to 6:00am, enter thru the ER entrance.    2. Follow the directions as prescribed by the doctor for your procedure and medications.         Morning of surgery take:zegerid         Stop vitamins, supplements and NSAIDS:      3. Check with your Doctor regarding stopping blood thinners and follow their instructions.    4. Do not smoke, vape or use chewing tobacco morning of surgery. Do not drink any alcoholic beverages 24 hours prior to surgery.       This includes NA Beer. No street drugs 7 days prior to surgery.    5. If you have dentures, contacts of glasses they will be removed before going to the OR; please bring a case.    6. Please bring picture ID, insurance card, paperwork from the doctor’s office (H & P, Consent, & card for implantable devices).    7. Take a shower with an antibacterial soap the night before surgery and the morning of surgery. Do not put anything on your skin      After your morning shower.    8. You will need a responsible adult to drive you home and check on you after surgery.

## 2025-03-01 ENCOUNTER — HOSPITAL ENCOUNTER (OUTPATIENT)
Dept: ULTRASOUND IMAGING | Age: 70
Discharge: HOME | End: 2025-03-01
Payer: MEDICARE

## 2025-03-01 DIAGNOSIS — Z87.891 FORMER SMOKER: ICD-10-CM

## 2025-03-01 PROCEDURE — 76775 US EXAM ABDO BACK WALL LIM: CPT

## 2025-03-03 PROBLEM — I70.0 ATHEROSCLEROSIS OF ABDOMINAL AORTA: Status: ACTIVE | Noted: 2025-03-03

## 2025-03-04 ENCOUNTER — ANESTHESIA EVENT (OUTPATIENT)
Dept: ENDOSCOPY | Age: 70
End: 2025-03-04
Payer: MEDICARE

## 2025-03-04 RX ORDER — ONDANSETRON 2 MG/ML
4 INJECTION INTRAMUSCULAR; INTRAVENOUS
Status: CANCELLED | OUTPATIENT
Start: 2025-03-04 | End: 2025-03-05

## 2025-03-04 RX ORDER — SODIUM CHLORIDE 0.9 % (FLUSH) 0.9 %
5-40 SYRINGE (ML) INJECTION EVERY 12 HOURS SCHEDULED
Status: CANCELLED | OUTPATIENT
Start: 2025-03-04

## 2025-03-04 RX ORDER — SODIUM CHLORIDE 9 MG/ML
INJECTION, SOLUTION INTRAVENOUS PRN
Status: CANCELLED | OUTPATIENT
Start: 2025-03-04

## 2025-03-04 RX ORDER — PROCHLORPERAZINE EDISYLATE 5 MG/ML
5 INJECTION INTRAMUSCULAR; INTRAVENOUS
Status: CANCELLED | OUTPATIENT
Start: 2025-03-04 | End: 2025-03-05

## 2025-03-04 RX ORDER — NALOXONE HYDROCHLORIDE 0.4 MG/ML
INJECTION, SOLUTION INTRAMUSCULAR; INTRAVENOUS; SUBCUTANEOUS PRN
Status: CANCELLED | OUTPATIENT
Start: 2025-03-04

## 2025-03-04 RX ORDER — SODIUM CHLORIDE 0.9 % (FLUSH) 0.9 %
5-40 SYRINGE (ML) INJECTION PRN
Status: CANCELLED | OUTPATIENT
Start: 2025-03-04

## 2025-03-04 RX ORDER — HYDRALAZINE HYDROCHLORIDE 20 MG/ML
10 INJECTION INTRAMUSCULAR; INTRAVENOUS
Status: CANCELLED | OUTPATIENT
Start: 2025-03-04

## 2025-03-04 NOTE — ANESTHESIA PRE PROCEDURE
Department of Anesthesiology  Preprocedure Note       Name:  Tony Reis   Age:  69 y.o.  :  1955                                          MRN:  [id number]         Date:  3/4/2025      Surgeon: Surgeon(s):  Hilton Ko MD    Procedure: Procedure(s):  COLONOSCOPY DIAGNOSTIC    Medications prior to admission:   Prior to Admission medications    Medication Sig Start Date End Date Taking? Authorizing Provider   Omeprazole-Sodium Bicarbonate (ZEGERID PO) Take by mouth daily   Yes Provider, MD Rio       Current medications:    No current facility-administered medications for this encounter.     Current Outpatient Medications   Medication Sig Dispense Refill   • Omeprazole-Sodium Bicarbonate (ZEGERID PO) Take by mouth daily         Allergies:    Allergies   Allergen Reactions   • Codeine Nausea And Vomiting   • Vicodin [Hydrocodone-Acetaminophen] Nausea And Vomiting and Other (See Comments)     Other reaction(s): Other - comment required  sweating  sweats       Problem List:    Patient Active Problem List   Diagnosis Code   • Psoriasis L40.9   • Irregular heart beat I49.9   • GERD (gastroesophageal reflux disease) K21.9   • History of total left hip arthroplasty Z96.642   • Pain and swelling of lower leg M79.669, M79.89   • History of total hip arthroplasty Z96.649   • Osteoarthritis M19.90   • 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

## 2025-03-05 ENCOUNTER — ANESTHESIA (OUTPATIENT)
Dept: ENDOSCOPY | Age: 70
End: 2025-03-05
Payer: MEDICARE

## 2025-03-05 ENCOUNTER — HOSPITAL ENCOUNTER (OUTPATIENT)
Age: 70
Setting detail: OUTPATIENT SURGERY
Discharge: HOME | End: 2025-03-05
Attending: INTERNAL MEDICINE | Admitting: INTERNAL MEDICINE
Payer: MEDICARE

## 2025-03-05 ENCOUNTER — APPOINTMENT (OUTPATIENT)
Dept: CT IMAGING | Age: 70
End: 2025-03-05
Attending: INTERNAL MEDICINE
Payer: MEDICARE

## 2025-03-05 VITALS
WEIGHT: 177 LBS | SYSTOLIC BLOOD PRESSURE: 123 MMHG | HEIGHT: 68 IN | HEART RATE: 80 BPM | RESPIRATION RATE: 18 BRPM | OXYGEN SATURATION: 97 % | DIASTOLIC BLOOD PRESSURE: 81 MMHG | BODY MASS INDEX: 26.83 KG/M2 | TEMPERATURE: 97.3 F

## 2025-03-05 DIAGNOSIS — K52.9 CHRONIC DIARRHEA: ICD-10-CM

## 2025-03-05 DIAGNOSIS — C20 CARCINOMA OF RECTUM (HCC): Primary | ICD-10-CM

## 2025-03-05 PROCEDURE — 6360000004 HC RX CONTRAST MEDICATION: Performed by: INTERNAL MEDICINE

## 2025-03-05 PROCEDURE — 88305 TISSUE EXAM BY PATHOLOGIST: CPT | Performed by: PATHOLOGY

## 2025-03-05 PROCEDURE — 2580000003 HC RX 258: Performed by: NURSE ANESTHETIST, CERTIFIED REGISTERED

## 2025-03-05 PROCEDURE — 3700000000 HC ANESTHESIA ATTENDED CARE: Performed by: INTERNAL MEDICINE

## 2025-03-05 PROCEDURE — 3609010600 HC COLONOSCOPY POLYPECTOMY SNARE/COLD BIOPSY: Performed by: INTERNAL MEDICINE

## 2025-03-05 PROCEDURE — 6360000002 HC RX W HCPCS: Performed by: NURSE ANESTHETIST, CERTIFIED REGISTERED

## 2025-03-05 PROCEDURE — 3700000001 HC ADD 15 MINUTES (ANESTHESIA): Performed by: INTERNAL MEDICINE

## 2025-03-05 PROCEDURE — 7100000011 HC PHASE II RECOVERY - ADDTL 15 MIN: Performed by: INTERNAL MEDICINE

## 2025-03-05 PROCEDURE — 7100000010 HC PHASE II RECOVERY - FIRST 15 MIN: Performed by: INTERNAL MEDICINE

## 2025-03-05 PROCEDURE — 74177 CT ABD & PELVIS W/CONTRAST: CPT

## 2025-03-05 PROCEDURE — 2709999900 HC NON-CHARGEABLE SUPPLY: Performed by: INTERNAL MEDICINE

## 2025-03-05 RX ORDER — IOPAMIDOL 755 MG/ML
75 INJECTION, SOLUTION INTRAVASCULAR
Status: COMPLETED | OUTPATIENT
Start: 2025-03-05 | End: 2025-03-05

## 2025-03-05 RX ORDER — SODIUM CHLORIDE 0.9 % (FLUSH) 0.9 %
5-40 SYRINGE (ML) INJECTION EVERY 12 HOURS SCHEDULED
Status: DISCONTINUED | OUTPATIENT
Start: 2025-03-05 | End: 2025-03-05 | Stop reason: HOSPADM

## 2025-03-05 RX ORDER — SODIUM CHLORIDE 0.9 % (FLUSH) 0.9 %
5-40 SYRINGE (ML) INJECTION PRN
Status: DISCONTINUED | OUTPATIENT
Start: 2025-03-05 | End: 2025-03-05 | Stop reason: HOSPADM

## 2025-03-05 RX ORDER — SODIUM CHLORIDE, SODIUM LACTATE, POTASSIUM CHLORIDE, CALCIUM CHLORIDE 600; 310; 30; 20 MG/100ML; MG/100ML; MG/100ML; MG/100ML
INJECTION, SOLUTION INTRAVENOUS
Status: DISCONTINUED | OUTPATIENT
Start: 2025-03-05 | End: 2025-03-05 | Stop reason: SDUPTHER

## 2025-03-05 RX ORDER — LIDOCAINE HYDROCHLORIDE 20 MG/ML
INJECTION, SOLUTION EPIDURAL; INFILTRATION; INTRACAUDAL; PERINEURAL
Status: DISCONTINUED | OUTPATIENT
Start: 2025-03-05 | End: 2025-03-05 | Stop reason: SDUPTHER

## 2025-03-05 RX ORDER — SODIUM CHLORIDE 9 MG/ML
INJECTION, SOLUTION INTRAVENOUS PRN
Status: DISCONTINUED | OUTPATIENT
Start: 2025-03-05 | End: 2025-03-05 | Stop reason: HOSPADM

## 2025-03-05 RX ORDER — PROPOFOL 10 MG/ML
INJECTION, EMULSION INTRAVENOUS
Status: DISCONTINUED | OUTPATIENT
Start: 2025-03-05 | End: 2025-03-05 | Stop reason: SDUPTHER

## 2025-03-05 RX ORDER — SODIUM CHLORIDE, SODIUM LACTATE, POTASSIUM CHLORIDE, CALCIUM CHLORIDE 600; 310; 30; 20 MG/100ML; MG/100ML; MG/100ML; MG/100ML
INJECTION, SOLUTION INTRAVENOUS CONTINUOUS
Status: DISCONTINUED | OUTPATIENT
Start: 2025-03-05 | End: 2025-03-05 | Stop reason: HOSPADM

## 2025-03-05 RX ADMIN — LIDOCAINE HYDROCHLORIDE 100 MG: 20 INJECTION, SOLUTION EPIDURAL; INFILTRATION; INTRACAUDAL; PERINEURAL at 11:11

## 2025-03-05 RX ADMIN — PROPOFOL 300 MG: 10 INJECTION, EMULSION INTRAVENOUS at 11:11

## 2025-03-05 RX ADMIN — SODIUM CHLORIDE, POTASSIUM CHLORIDE, SODIUM LACTATE AND CALCIUM CHLORIDE: 600; 310; 30; 20 INJECTION, SOLUTION INTRAVENOUS at 10:49

## 2025-03-05 RX ADMIN — IOPAMIDOL 75 ML: 755 INJECTION, SOLUTION INTRAVENOUS at 13:10

## 2025-03-05 ASSESSMENT — PAIN - FUNCTIONAL ASSESSMENT
PAIN_FUNCTIONAL_ASSESSMENT: 0-10

## 2025-03-05 ASSESSMENT — PAIN SCALES - GENERAL: PAINLEVEL_OUTOF10: 0

## 2025-03-05 NOTE — DISCHARGE INSTRUCTIONS
evening @ home.  Resume normal activities as you begin to feel better.  Eat lightly for your first meal, then gradually increase your diet to what is normal for you.  In case of nausea, avoid food and drink only clear liquids.  Resume food as nausea ceases.  Notify your surgeon if you experience fever, chills, large amount of bleeding, difficulty breathing, persistent nausea and vomiting or any other disturbing problem.  Call for a follow-up appointment with your surgeon.

## 2025-03-05 NOTE — H&P
ENDOSCOPY   Pre-operative History and Physical    Patient: Tony Reis  : 1955      History Obtained From:  patient        HISTORY OF PRESENT ILLNESS:                The patient is a 69 y.o. male with significant past medical history as below who presents for colonoscopy    Indication : Chronic Diarrhea    Past Medical History:        Diagnosis Date    Avascular necrosis (HCC)     \"Both Hips\"    Bronchitis Last Episode In 's Or 1970's    GERD (gastroesophageal reflux disease)     [place] (hard of hearing)     Bilateral Ears    Irregular heart beat     No Cardiologist At This Time--pt states due to caffeine use    Osteoarthritis     \"Hips\"    Psoriasis     Shingles Dx     \"Right Side Around To Back\"    Teeth missing     Upper And Lower    Wears glasses        Past Surgical History:        Procedure Laterality Date    DENTAL SURGERY      Teeth Extracted In Past    OTHER SURGICAL HISTORY Bilateral 2015    Intra Articular Steroid Injection Bilateral  Hips    TIBIA FRACTURE SURGERY Left 2018    IM nail insertion tibia    TONSILLECTOMY      TOTAL HIP ARTHROPLASTY Left 2015    TOTAL HIP ARTHROPLASTY Right 2015         Current Medications:    Medications    Prior to Admission medications    Medication Sig Start Date End Date Taking? Authorizing Provider   Omeprazole-Sodium Bicarbonate (ZEGERID PO) Take by mouth daily    Provider, MD Rio      Scheduled Medications:    sodium chloride flush  5-40 mL IntraVENous 2 times per day    sodium chloride flush  5-40 mL IntraVENous 2 times per day     Infusions:    sodium chloride      sodium chloride      lactated ringers       PRN Medications: sodium chloride flush, sodium chloride, sodium chloride flush, sodium chloride    Allergies:   Allergies   Allergen Reactions    Codeine Nausea And Vomiting    Vicodin [Hydrocodone-Acetaminophen] Nausea And Vomiting and Other (See Comments)     Other reaction(s): Other - comment

## 2025-03-05 NOTE — PROGRESS NOTES
1141 Patient to room from PACU. Report from Adeline ONEAL. No c/o pain.  VSS. Assessment WNL. Family at bedside. Call light within reach.     1159 MD at bedside to talk to patient.     1204 VSS. Assessment WNL. Patient sitting up in bed at this time.

## 2025-03-05 NOTE — ANESTHESIA POSTPROCEDURE EVALUATION
Department of Anesthesiology  Postprocedure Note    Patient: Tony Reis  MRN: [id number]  YOB: 1955  Date of evaluation: 3/5/2025    Procedure Summary       Date: 03/05/25 Room / Location: [place] [address] / [facility]    Anesthesia Start: 1055 Anesthesia Stop: 1136    Procedure: COLONOSCOPY POLYPECTOMY SNARE/BIOPSY Diagnosis:       Chronic diarrhea      (Chronic diarrhea [K52.9])    Surgeons: Hilton Ko MD Responsible Provider: Mamadou Amin MD    Anesthesia Type: MAC ASA Status: 2            Anesthesia Type: No value filed.    Rip Phase I: Rip Score: 10    Rip Phase II:      Anesthesia Post Evaluation    Patient location during evaluation: bedside  Patient participation: complete - patient participated  Level of consciousness: awake and alert  Pain score: 0  Airway patency: patent  Nausea & Vomiting: no vomiting and no nausea  Cardiovascular status: blood pressure returned to baseline and hemodynamically stable  Respiratory status: acceptable, room air, spontaneous ventilation and nonlabored ventilation  Hydration status: stable  Pain management: adequate    No notable events documented.

## 2025-03-05 NOTE — PROGRESS NOTES
Patient stated his stool is \"brown with chunks\" RN notified MD. Enema given. Patient unable to hold enema. Patient stated stool is now yellow.

## 2025-03-05 NOTE — PROGRESS NOTES
Pt. Returned to \[facility]\"" via cart from CT. Per Dr. Ko, pt. Can be discharged home now. Pt. Denies pain or nausea. Water given per request. Vs stable. Pt. Getting dressed. Will go over discharge instructions with pt. And wife. No further questions/concerns at this time.

## 2025-03-06 LAB — SURGICAL PATHOLOGY REPORT: NORMAL

## 2025-03-07 DIAGNOSIS — C20 CARCINOMA OF RECTUM (HCC): Primary | ICD-10-CM

## 2025-03-07 NOTE — RESULT ENCOUNTER NOTE
Biopsy did not come back as cancer even though there was a strong suspicion of cancer.  CT scan did show evidence of rectal cancer.  You need repeat biopsy with sigmoidoscopy.  Will order MRI of the pelvis.  You will be referred to the [facility] oncology group for further management.

## 2025-03-10 ENCOUNTER — RESULTS FOLLOW-UP (OUTPATIENT)
Dept: ENDOSCOPY | Age: 70
End: 2025-03-10

## 2025-03-14 NOTE — PROGRESS NOTES
Patient Name:  Tony Reis  Patient :  1955  Patient MRN:  [id number]     Primary Oncologist: Guero Guadarrama MD  Referring Provider: Seun Cedeno MD     Date of Service: 3/21/2025      Reason for Consult:  rectal cancer      Chief Complaint:    Chief Complaint   Patient presents with    New Patient        Patient Active Problem List:     Psoriasis     Irregular heart beat     GERD (gastroesophageal reflux disease)     History of total left hip arthroplasty     Pain and swelling of lower leg     History of total hip arthroplasty     Osteoarthritis     Status post total replacement of both hips     Dental caries     Open displaced oblique fracture of shaft of left tibia     Diarrhea     Foot swelling     Chronic diarrhea     Atherosclerosis of abdominal aorta     Carcinoma of rectum (HCC)     HPI:   Tomas Reis is a pleasant 68 yo male patient who was referred for evaluation of clinical rectal cancer.  Came in with significant other who stated that patient has had chronic diarrhea since 2024    1/10/25 CMP and CBC grossly wnl. Hep C Ab non reactive.  3/5/25 colonoscopy: The perianal exam was abnormal.  The digital rectal exam revealed a 1 cm (diameter) nodular, obstructing, hard and fixed rectal mass palpated 1.0 cm from the anal verge.  The mass was circumferential.  A malignant-appearing, intrinsic moderate stenosis measuring 8 cm (in length) x 12 mm (inner diameter) was found in the rectum and  was traversed.  A fungating, infiltrative and ulcerated partially obstructing large mass was found at 1 cm proximal to the anus and in the rectum. The mass was circumferential. The mass measured 8 cm (in length). Oozing was present. Biopsies were taken with a cold forceps for histology.  A few medium-mouthed diverticula were found in the sigmoid colon and descending colon.  A 4 mm polyp was found in the cecum. The polyp was sessile. The polyp was removed with a cold snare. Resection and retrieval

## 2025-03-17 ENCOUNTER — RESULTS FOLLOW-UP (OUTPATIENT)
Dept: MRI IMAGING | Age: 70
End: 2025-03-17

## 2025-03-17 ENCOUNTER — HOSPITAL ENCOUNTER (OUTPATIENT)
Dept: MRI IMAGING | Age: 70
Discharge: HOME | End: 2025-03-17
Attending: INTERNAL MEDICINE
Payer: MEDICARE

## 2025-03-17 DIAGNOSIS — C20 CARCINOMA OF RECTUM: ICD-10-CM

## 2025-03-17 DIAGNOSIS — C20 RECTAL CANCER (HCC): Primary | ICD-10-CM

## 2025-03-17 PROCEDURE — 72197 MRI PELVIS W/O & W/DYE: CPT

## 2025-03-17 PROCEDURE — A9579 GAD-BASE MR CONTRAST NOS,1ML: HCPCS | Performed by: INTERNAL MEDICINE

## 2025-03-17 PROCEDURE — 6360000004 HC RX CONTRAST MEDICATION: Performed by: INTERNAL MEDICINE

## 2025-03-17 RX ADMIN — GADOTERIDOL 15 ML: 279.3 INJECTION, SOLUTION INTRAVENOUS at 09:39

## 2025-03-17 NOTE — RESULT ENCOUNTER NOTE
Tony,    This is to inform you about the results of MRI scan of the pelvis you have had recently on 17th March.  The pelvic MRI does show a tumor in the rectum and appearance is highly suggestive of a cancer with infiltration into the surrounding tissues in the rectum and also into the lymph nodes.  Based on which, the local staging is T3 N1.  The biopsy we did with colonoscopy did not show the cancerous tissue but it was probably a sampling error and I strongly suspected cancer.  Because of this concern, I am referring you to a cancer specialist and also I am going to order repeat biopsy with sigmoidoscopy.  Our office will reach out to you and please call us back immediately to schedule your sigmoidoscopy and biopsy.

## 2025-03-18 ENCOUNTER — PREP FOR PROCEDURE (OUTPATIENT)
Dept: GASTROENTEROLOGY | Age: 70
End: 2025-03-18

## 2025-03-19 RX ORDER — SODIUM CHLORIDE 9 MG/ML
25 INJECTION, SOLUTION INTRAVENOUS PRN
Status: CANCELLED | OUTPATIENT
Start: 2025-03-19

## 2025-03-19 RX ORDER — SODIUM CHLORIDE, SODIUM LACTATE, POTASSIUM CHLORIDE, CALCIUM CHLORIDE 600; 310; 30; 20 MG/100ML; MG/100ML; MG/100ML; MG/100ML
INJECTION, SOLUTION INTRAVENOUS CONTINUOUS
Status: CANCELLED | OUTPATIENT
Start: 2025-03-19

## 2025-03-19 RX ORDER — SODIUM CHLORIDE 0.9 % (FLUSH) 0.9 %
5-40 SYRINGE (ML) INJECTION PRN
Status: CANCELLED | OUTPATIENT
Start: 2025-03-19

## 2025-03-19 RX ORDER — SODIUM CHLORIDE 0.9 % (FLUSH) 0.9 %
5-40 SYRINGE (ML) INJECTION EVERY 12 HOURS SCHEDULED
Status: CANCELLED | OUTPATIENT
Start: 2025-03-19

## 2025-03-21 ENCOUNTER — INITIAL CONSULT (OUTPATIENT)
Dept: ONCOLOGY | Age: 70
End: 2025-03-21
Payer: MEDICARE

## 2025-03-21 ENCOUNTER — HOSPITAL ENCOUNTER (OUTPATIENT)
Dept: RADIATION ONCOLOGY | Age: 70
Discharge: HOME | End: 2025-03-21

## 2025-03-21 ENCOUNTER — HOSPITAL ENCOUNTER (OUTPATIENT)
Dept: INFUSION THERAPY | Age: 70
Discharge: HOME | End: 2025-03-21
Payer: MEDICARE

## 2025-03-21 ENCOUNTER — CLINICAL DOCUMENTATION (OUTPATIENT)
Dept: ONCOLOGY | Age: 70
End: 2025-03-21

## 2025-03-21 VITALS
BODY MASS INDEX: 25.46 KG/M2 | TEMPERATURE: 97.4 F | RESPIRATION RATE: 17 BRPM | HEIGHT: 68 IN | HEART RATE: 97 BPM | WEIGHT: 168 LBS | DIASTOLIC BLOOD PRESSURE: 72 MMHG | SYSTOLIC BLOOD PRESSURE: 131 MMHG

## 2025-03-21 VITALS
BODY MASS INDEX: 25.49 KG/M2 | HEART RATE: 97 BPM | WEIGHT: 168.2 LBS | DIASTOLIC BLOOD PRESSURE: 72 MMHG | SYSTOLIC BLOOD PRESSURE: 131 MMHG | HEIGHT: 68 IN | OXYGEN SATURATION: 96 % | TEMPERATURE: 97.4 F

## 2025-03-21 DIAGNOSIS — D49.0 RECTAL NEOPLASM: Primary | ICD-10-CM

## 2025-03-21 DIAGNOSIS — C20 CARCINOMA OF RECTUM: Primary | ICD-10-CM

## 2025-03-21 DIAGNOSIS — D49.0 RECTAL NEOPLASM: ICD-10-CM

## 2025-03-21 LAB
ALBUMIN SERPL-MCNC: 3.6 G/DL (ref 3.4–5)
ALBUMIN/GLOB SERPL: 1.1 {RATIO} (ref 1.1–2.2)
ALP SERPL-CCNC: 101 U/L (ref 40–129)
ALT SERPL-CCNC: 8 U/L (ref 10–40)
ANION GAP SERPL CALCULATED.3IONS-SCNC: 11 MMOL/L (ref 9–17)
AST SERPL-CCNC: 17 U/L (ref 15–37)
BASOPHILS # BLD: 0.02 K/UL
BASOPHILS NFR BLD: 0 % (ref 0–1)
BILIRUB SERPL-MCNC: 0.3 MG/DL (ref 0–1)
BUN SERPL-MCNC: 18 MG/DL (ref 7–20)
CALCIUM SERPL-MCNC: 8.6 MG/DL (ref 8.3–10.6)
CEA SERPL-MCNC: 4.3 NG/ML (ref 0–5)
CHLORIDE SERPL-SCNC: 106 MMOL/L (ref 99–110)
CO2 SERPL-SCNC: 24 MMOL/L (ref 21–32)
CREAT SERPL-MCNC: 0.6 MG/DL (ref 0.8–1.3)
EOSINOPHIL # BLD: 0.9 K/UL
EOSINOPHILS RELATIVE PERCENT: 10 % (ref 0–3)
ERYTHROCYTE [DISTWIDTH] IN BLOOD BY AUTOMATED COUNT: 15.7 % (ref 11.7–14.9)
FERRITIN SERPL-MCNC: 15 NG/ML (ref 30–400)
GFR, ESTIMATED: >90 ML/MIN/1.73M2
GLUCOSE SERPL-MCNC: 96 MG/DL (ref 74–99)
HCT VFR BLD AUTO: 40.6 % (ref 42–52)
HGB BLD-MCNC: 12.7 G/DL (ref 13.5–18)
IRON SATN MFR SERPL: 6 % (ref 15–50)
IRON SERPL-MCNC: 26 UG/DL (ref 59–158)
LYMPHOCYTES NFR BLD: 2.24 K/UL
LYMPHOCYTES RELATIVE PERCENT: 26 % (ref 24–44)
MCH RBC QN AUTO: 28.4 PG (ref 27–31)
MCHC RBC AUTO-ENTMCNC: 31.3 G/DL (ref 32–36)
MCV RBC AUTO: 90.8 FL (ref 78–100)
MONOCYTES NFR BLD: 0.63 K/UL
MONOCYTES NFR BLD: 7 % (ref 0–4)
NEUTROPHILS NFR BLD: 56 % (ref 36–66)
NEUTS SEG NFR BLD: 4.87 K/UL
PLATELET # BLD AUTO: 439 K/UL (ref 140–440)
PMV BLD AUTO: 9 FL (ref 7.5–11.1)
POTASSIUM SERPL-SCNC: 4 MMOL/L (ref 3.5–5.1)
PROT SERPL-MCNC: 6.8 G/DL (ref 6.4–8.2)
RBC # BLD AUTO: 4.47 M/UL (ref 4.6–6.2)
SODIUM SERPL-SCNC: 140 MMOL/L (ref 136–145)
TIBC SERPL-MCNC: 432 UG/DL (ref 260–445)
UNSATURATED IRON BINDING CAPACITY: 406 UG/DL (ref 110–370)
WBC OTHER # BLD: 8.7 K/UL (ref 4–10.5)

## 2025-03-21 PROCEDURE — 82378 CARCINOEMBRYONIC ANTIGEN: CPT

## 2025-03-21 PROCEDURE — 3017F COLORECTAL CA SCREEN DOC REV: CPT | Performed by: INTERNAL MEDICINE

## 2025-03-21 PROCEDURE — 1124F ACP DISCUSS-NO DSCNMKR DOCD: CPT | Performed by: INTERNAL MEDICINE

## 2025-03-21 PROCEDURE — 99205 OFFICE O/P NEW HI 60 MIN: CPT | Performed by: INTERNAL MEDICINE

## 2025-03-21 PROCEDURE — G8419 CALC BMI OUT NRM PARAM NOF/U: HCPCS | Performed by: INTERNAL MEDICINE

## 2025-03-21 PROCEDURE — 1126F AMNT PAIN NOTED NONE PRSNT: CPT | Performed by: INTERNAL MEDICINE

## 2025-03-21 PROCEDURE — 1159F MED LIST DOCD IN RCRD: CPT | Performed by: INTERNAL MEDICINE

## 2025-03-21 PROCEDURE — 99202 OFFICE O/P NEW SF 15 MIN: CPT

## 2025-03-21 PROCEDURE — 83550 IRON BINDING TEST: CPT

## 2025-03-21 PROCEDURE — G8427 DOCREV CUR MEDS BY ELIG CLIN: HCPCS | Performed by: INTERNAL MEDICINE

## 2025-03-21 PROCEDURE — 85025 COMPLETE CBC W/AUTO DIFF WBC: CPT

## 2025-03-21 PROCEDURE — 1036F TOBACCO NON-USER: CPT | Performed by: INTERNAL MEDICINE

## 2025-03-21 PROCEDURE — 82728 ASSAY OF FERRITIN: CPT

## 2025-03-21 PROCEDURE — 83540 ASSAY OF IRON: CPT

## 2025-03-21 PROCEDURE — 80053 COMPREHEN METABOLIC PANEL: CPT

## 2025-03-21 PROCEDURE — 36415 COLL VENOUS BLD VENIPUNCTURE: CPT

## 2025-03-21 ASSESSMENT — PAIN SCALES - GENERAL: PAINLEVEL_OUTOF10: 0

## 2025-03-21 NOTE — PROGRESS NOTES
Patient will be called with an arrival time on 3/27/2025 for his procedure at [facility] on 3/28/2025.    NOTHING TO EAT OR DRINK AFTER MIDNIGHT DAY OF SURGERY    1. Enter thru the hospital main entrance on day of surgery, check in at the Information Desk. If you arrive prior to 6:00am, enter thru the ER entrance.    2. Follow the directions as prescribed by the doctor for your procedure and medications.         Morning of surgery take:zegerid         Stop vitamins, supplements and NSAIDS:      3. Check with your Doctor regarding stopping blood thinners and follow their instructions.    4. Do not smoke, vape or use chewing tobacco morning of surgery. Do not drink any alcoholic beverages 24 hours prior to surgery.       This includes NA Beer. No street drugs 7 days prior to surgery.    5. If you have dentures, contacts of glasses they will be removed before going to the OR; please bring a case.    6. Please bring picture ID, insurance card, paperwork from the doctor’s office (H & P, Consent, & card for implantable devices).    7. Take a shower with an antibacterial soap the night before surgery and the morning of surgery. Do not put anything on your skin      After your morning shower.    8. You will need a responsible adult to drive you home and check on you after surgery.

## 2025-03-21 NOTE — CONSULTS
He is seeing GI on Monday and will have another biopsy done after flex sig.  He is seeing surgery next Wednesday.    If he has locally advanced disease then I recommend neoadjuvant radiation therapy with concurrent chemotherapy followed by chemotherapy and likely surgery.  The indications, risks, and benefits of treatment were discussed at length and after all questions were answered he expressed understand his diagnosis, prognosis, my recommendations.    Will await biopsy results, CT chest, and surgery visit. Will discuss with Dr. Guadarrama and go from there.     Electronically signed by Tate Puga MD on 3/21/2025 at 3:49 PM

## 2025-03-21 NOTE — PROGRESS NOTES
TIBIA FRACTURE SURGERY Left 06/29/2018    IM nail insertion tibia    TONSILLECTOMY  1961    TOTAL HIP ARTHROPLASTY Left 08/31/2015    TOTAL HIP ARTHROPLASTY Right 12/08/2015       Allergies   Allergen Reactions    Codeine Nausea And Vomiting    Vicodin [Hydrocodone-Acetaminophen] Nausea And Vomiting and Other (See Comments)     Other reaction(s): Other - comment required  sweating  sweats          Current Outpatient Medications:     Omeprazole-Sodium Bicarbonate (ZEGERID PO), Take by mouth daily, Disp: , Rfl:         Electronically signed by Rachel Palacios RN on 3/21/2025 at 3:47 PM

## 2025-03-21 NOTE — PROGRESS NOTES
MA Rooming Questions  Patient: Tony Reis  MRN: [id number]    Date: 3/21/2025        New Patient        5. Did the patient have a depression screening completed today? No    No data recorded     PHQ-9 Given to (if applicable):               PHQ-9 Score (if applicable):                     [] Positive     []  Negative              Does question #9 need addressed (if applicable)                     [] Yes    []  No               Brenda Daniels MA

## 2025-03-24 ENCOUNTER — OFFICE VISIT (OUTPATIENT)
Dept: GASTROENTEROLOGY | Age: 70
End: 2025-03-24
Payer: MEDICARE

## 2025-03-24 ENCOUNTER — TELEPHONE (OUTPATIENT)
Dept: ONCOLOGY | Age: 70
End: 2025-03-24

## 2025-03-24 VITALS
RESPIRATION RATE: 17 BRPM | HEART RATE: 100 BPM | SYSTOLIC BLOOD PRESSURE: 126 MMHG | WEIGHT: 170.4 LBS | BODY MASS INDEX: 25.82 KG/M2 | OXYGEN SATURATION: 92 % | HEIGHT: 68 IN | DIASTOLIC BLOOD PRESSURE: 76 MMHG

## 2025-03-24 DIAGNOSIS — K59.02 CONSTIPATION DUE TO OUTLET DYSFUNCTION: Primary | ICD-10-CM

## 2025-03-24 PROCEDURE — 1159F MED LIST DOCD IN RCRD: CPT | Performed by: INTERNAL MEDICINE

## 2025-03-24 PROCEDURE — 99214 OFFICE O/P EST MOD 30 MIN: CPT | Performed by: INTERNAL MEDICINE

## 2025-03-24 PROCEDURE — 1124F ACP DISCUSS-NO DSCNMKR DOCD: CPT | Performed by: INTERNAL MEDICINE

## 2025-03-24 PROCEDURE — 1160F RVW MEDS BY RX/DR IN RCRD: CPT | Performed by: INTERNAL MEDICINE

## 2025-03-24 RX ORDER — BISACODYL 5 MG
10 TABLET, DELAYED RELEASE (ENTERIC COATED) ORAL DAILY PRN
Qty: 2 TABLET | Refills: 0 | Status: SHIPPED | OUTPATIENT
Start: 2025-03-24 | End: 2025-03-25

## 2025-03-24 RX ORDER — FERROUS SULFATE 325(65) MG
325 TABLET ORAL
Qty: 30 TABLET | Refills: 5 | Status: SHIPPED | OUTPATIENT
Start: 2025-03-24

## 2025-03-24 RX ORDER — POLYETHYLENE GLYCOL 3350 17 G/17G
17 POWDER, FOR SOLUTION ORAL DAILY
Qty: 1530 G | Refills: 1 | Status: SHIPPED | OUTPATIENT
Start: 2025-03-24 | End: 2025-09-20

## 2025-03-24 NOTE — TELEPHONE ENCOUNTER
3/24/25 - called the pt to schedule the CT chest. Pt will call me back tomorrow to schedule. He needs to speak with his wife to see her schedule.I gave him my direct line.

## 2025-03-24 NOTE — PROGRESS NOTES
This nurse called the patient @ [phone] to review lab results. Patient was notified of the recommendation for him to take an oral iron tablet daily and that he can begin this medication after his colonoscopy on 03/28. Patient verbalized understanding and denies further needs at this time.

## 2025-03-24 NOTE — PROGRESS NOTES
[facility] Gastroenterology and Hepatology      MD Andre Hooks MD Carol Christensen, APRN-CNP       Rosalina Powell, APRN-CNP             [address] [place] [address]             [phone] fax [phone]        Gastroenterology Clinic Follow Up Visit    Hilton Ko MD  Encounter Date: 03/24/25     CC: Follow-up ( F/U FROM PROCEDURE PER DR \"O\"/)       No referring provider defined for this encounter.     History obtained from: patient and medical records     Subjective:     Tony Reis is an 69 y.o.  male who presents for Follow-up ( F/U FROM PROCEDURE PER DR \"O\"/)    69-year-old pleasant male presents for follow-up.  He had undergone colonoscopy for the evaluation of chronic diarrhea and found to have a mass in the rectum causing partial obstruction.  The mass was fungating in nature.  Multiple biopsies were obtained.  However biopsy showed tubular adenoma.  However the mass appearance was highly suggestive of carcinoma of the rectum.  Patient was seen by the medical oncologist Dr. Jaspreet Guadarrama.  He is going to see Dr. Valadez, the surgical oncology department at [facility] tomorrow.  Apparently, preop chemo and radiation is being planned pending the results of repeat rectal biopsy for the confirmation.  He continues to have diarrhea and lately he is having abdominal pain and bloating.  Gradually abdominal bloating and pain increasing.  No rectal bleeding.  He is going to have sigmoidoscopy and biopsy on Friday.  CT abdomen showed rectal mass with the enlarged perirectal lymph nodes.  He was also noted to have enlarged mesenteric lymph nodes.  Pelvic MRI confirmed the same with infiltration of the tumor tissue into the perirectal area and positive lymph nodes.  Based on MRI, patient seems to have stage IIIb stage.  Findings were discussed at length and all his questions were answered.     Review of Systems  Reviewed

## 2025-03-25 ENCOUNTER — TELEPHONE (OUTPATIENT)
Dept: ONCOLOGY | Age: 70
End: 2025-03-25

## 2025-03-25 NOTE — TELEPHONE ENCOUNTER
3/25/25 - spoke to pt and scheduled the 4/2/25 CT chest at [facility] location arrive at 10:30 am and NPO 4.

## 2025-03-27 ENCOUNTER — ANESTHESIA EVENT (OUTPATIENT)
Dept: ENDOSCOPY | Age: 70
End: 2025-03-27
Payer: MEDICARE

## 2025-03-27 NOTE — PROGRESS NOTES
Spoke with patient and he will arrive at 1115 at [facility] on 3/28/2025 for his procedure at 1245.

## 2025-03-27 NOTE — ANESTHESIA PRE PROCEDURE
Department of Anesthesiology  Preprocedure Note       Name:  Tony Reis   Age:  69 y.o.  :  1955                                          MRN:  [id number]         Date:  3/27/2025      Surgeon: Surgeon(s):  Hilton Ko MD    Procedure: Procedure(s):  SIGMOIDOSCOPY DIAGNOSTIC FLEXIBLE    Medications prior to admission:   Prior to Admission medications    Medication Sig Start Date End Date Taking? Authorizing Provider   polyethylene glycol (GLYCOLAX) 17 GM/SCOOP powder Take 17 g by mouth daily 3/24/25 9/20/25  Hilton Ko MD   ferrous sulfate (IRON 325) 325 (65 Fe) MG tablet Take 1 tablet by mouth daily (with breakfast) 3/24/25   Guero Guadarrama MD   Omeprazole-Sodium Bicarbonate (ZEGERID PO) Take by mouth daily    ProviderRio MD       Current medications:    No current facility-administered medications for this encounter.     Current Outpatient Medications   Medication Sig Dispense Refill   • polyethylene glycol (GLYCOLAX) 17 GM/SCOOP powder Take 17 g by mouth daily 1530 g 1   • ferrous sulfate (IRON 325) 325 (65 Fe) MG tablet Take 1 tablet by mouth daily (with breakfast) 30 tablet 5   • Omeprazole-Sodium Bicarbonate (ZEGERID PO) Take by mouth daily         Allergies:    Allergies   Allergen Reactions   • Codeine Nausea And Vomiting   • Vicodin [Hydrocodone-Acetaminophen] Nausea And Vomiting and Other (See Comments)     Other reaction(s): Other - comment required  sweating  sweats       Problem List:    Patient Active Problem List   Diagnosis Code   • Psoriasis L40.9   • Irregular heart beat I49.9   • GERD (gastroesophageal reflux disease) K21.9   • History of total left hip arthroplasty Z96.642   • Pain and swelling of lower leg M79.669, M79.89   • History of total hip arthroplasty Z96.649   • Osteoarthritis M19.90   • 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

## 2025-03-28 ENCOUNTER — ANESTHESIA (OUTPATIENT)
Dept: ENDOSCOPY | Age: 70
End: 2025-03-28
Payer: MEDICARE

## 2025-03-28 ENCOUNTER — HOSPITAL ENCOUNTER (OUTPATIENT)
Age: 70
Setting detail: OUTPATIENT SURGERY
Discharge: HOME | End: 2025-03-28
Attending: INTERNAL MEDICINE | Admitting: INTERNAL MEDICINE
Payer: MEDICARE

## 2025-03-28 VITALS
HEART RATE: 89 BPM | HEIGHT: 68 IN | RESPIRATION RATE: 18 BRPM | TEMPERATURE: 97.7 F | SYSTOLIC BLOOD PRESSURE: 124 MMHG | BODY MASS INDEX: 26.83 KG/M2 | DIASTOLIC BLOOD PRESSURE: 66 MMHG | WEIGHT: 177 LBS | OXYGEN SATURATION: 95 %

## 2025-03-28 DIAGNOSIS — C20 CARCINOMA OF RECTUM: ICD-10-CM

## 2025-03-28 PROCEDURE — 3609008300 HC SIGMOIDOSCOPY W/BIOPSY SINGLE/MULTIPLE: Performed by: INTERNAL MEDICINE

## 2025-03-28 PROCEDURE — 3700000001 HC ADD 15 MINUTES (ANESTHESIA): Performed by: INTERNAL MEDICINE

## 2025-03-28 PROCEDURE — 45331 SIGMOIDOSCOPY AND BIOPSY: CPT | Performed by: INTERNAL MEDICINE

## 2025-03-28 PROCEDURE — 3700000000 HC ANESTHESIA ATTENDED CARE: Performed by: INTERNAL MEDICINE

## 2025-03-28 PROCEDURE — 99239 HOSP IP/OBS DSCHRG MGMT >30: CPT | Performed by: INTERNAL MEDICINE

## 2025-03-28 PROCEDURE — 7100000010 HC PHASE II RECOVERY - FIRST 15 MIN: Performed by: INTERNAL MEDICINE

## 2025-03-28 PROCEDURE — 88305 TISSUE EXAM BY PATHOLOGIST: CPT | Performed by: PATHOLOGY

## 2025-03-28 PROCEDURE — 7100000011 HC PHASE II RECOVERY - ADDTL 15 MIN: Performed by: INTERNAL MEDICINE

## 2025-03-28 PROCEDURE — 2709999900 HC NON-CHARGEABLE SUPPLY: Performed by: INTERNAL MEDICINE

## 2025-03-28 PROCEDURE — 6360000002 HC RX W HCPCS

## 2025-03-28 RX ORDER — SODIUM CHLORIDE 9 MG/ML
INJECTION, SOLUTION INTRAVENOUS PRN
Status: DISCONTINUED | OUTPATIENT
Start: 2025-03-28 | End: 2025-03-28 | Stop reason: HOSPADM

## 2025-03-28 RX ORDER — SODIUM CHLORIDE 0.9 % (FLUSH) 0.9 %
5-40 SYRINGE (ML) INJECTION PRN
Status: DISCONTINUED | OUTPATIENT
Start: 2025-03-28 | End: 2025-03-28 | Stop reason: HOSPADM

## 2025-03-28 RX ORDER — SODIUM CHLORIDE 0.9 % (FLUSH) 0.9 %
5-40 SYRINGE (ML) INJECTION EVERY 12 HOURS SCHEDULED
Status: DISCONTINUED | OUTPATIENT
Start: 2025-03-28 | End: 2025-03-28 | Stop reason: HOSPADM

## 2025-03-28 RX ORDER — SODIUM CHLORIDE, SODIUM LACTATE, POTASSIUM CHLORIDE, CALCIUM CHLORIDE 600; 310; 30; 20 MG/100ML; MG/100ML; MG/100ML; MG/100ML
INJECTION, SOLUTION INTRAVENOUS CONTINUOUS
Status: DISCONTINUED | OUTPATIENT
Start: 2025-03-28 | End: 2025-03-28 | Stop reason: HOSPADM

## 2025-03-28 RX ORDER — SODIUM CHLORIDE 9 MG/ML
25 INJECTION, SOLUTION INTRAVENOUS PRN
Status: DISCONTINUED | OUTPATIENT
Start: 2025-03-28 | End: 2025-03-28 | Stop reason: HOSPADM

## 2025-03-28 RX ORDER — PROPOFOL 10 MG/ML
INJECTION, EMULSION INTRAVENOUS
Status: DISCONTINUED | OUTPATIENT
Start: 2025-03-28 | End: 2025-03-28 | Stop reason: SDUPTHER

## 2025-03-28 RX ORDER — LIDOCAINE HYDROCHLORIDE 20 MG/ML
INJECTION, SOLUTION EPIDURAL; INFILTRATION; INTRACAUDAL; PERINEURAL
Status: DISCONTINUED | OUTPATIENT
Start: 2025-03-28 | End: 2025-03-28 | Stop reason: SDUPTHER

## 2025-03-28 RX ADMIN — LIDOCAINE HYDROCHLORIDE 100 MG: 20 INJECTION, SOLUTION EPIDURAL; INFILTRATION; INTRACAUDAL; PERINEURAL at 13:15

## 2025-03-28 RX ADMIN — PROPOFOL 200 MG: 10 INJECTION, EMULSION INTRAVENOUS at 13:15

## 2025-03-28 ASSESSMENT — PAIN - FUNCTIONAL ASSESSMENT
PAIN_FUNCTIONAL_ASSESSMENT: 0-10

## 2025-03-28 NOTE — DISCHARGE INSTRUCTIONS
[facility]  [phone]    Do not drive, work around machines or use equipment.  Do not drink any alcoholic beverages.  Do not smoke while alone.  Avoid making important decisions.  Plan to spend a quiet, relaxed evening @ home.  Resume normal activities as you begin to feel better.  Eat lightly for your first meal, then gradually increase your diet to what is normal for you.  In case of nausea, avoid food and drink only clear liquids.  Resume food as nausea ceases.  Notify your surgeon if you experience fever, chills, large amount of bleeding, difficulty breathing, persistent nausea and vomiting or any other disturbing problem.  Call for a follow-up appointment with your surgeon. COLONOSCOPY        CALL OFFICE FOR FOLLOW UP APPOINTMENT     REPEAT PROCEDURE AS NEEDED     TEST ORDERED: NONE OR ______________________________________________________________________.    What to expect at home:  Your Recovery   Your doctor will tell you when you can eat and do your other usual activities Your doctor will talk to you about when you will need your next colonoscopy. Your doctor can help you decide how often you need to be checked. This will depend on the results of your test and your risk for colorectal cancer.  After the test, you may be bloated or have gas pains. You may need to pass gas. If a biopsy was done or a polyp was removed, you may have streaks of blood in your stool (feces) for a few days.  This care sheet gives you a general idea about how long it will take for you to recover. But each person recovers at a different pace. Follow the steps below to get better as quickly as possible.  How can you care for yourself at home?  Activity  Rest when you feel tired.  Diet  Follow your doctor's directions for eating.  Unless your doctor has told you not to, drink plenty of fluids. This helps to replace the fluids that were lost during the colon prep.  DO NOT DRINK

## 2025-03-28 NOTE — PROGRESS NOTES
1338 Patient to room. Report from KIMMY Hidalgo. VSS. Assessment WNL. Drink of choice given to patient. Family at bedside. Call light within reach.     1410 VSS. Assessment WNL.. Patient sitting on the side of the bed to get dressed. Discharge instructions given to patient. Patient verbalized understanding. IV removed. Wife at bedside    1423 Patient discharged to home with family member.

## 2025-03-28 NOTE — H&P
ENDOSCOPY   Pre-operative History and Physical    Patient: Tony Reis  : 1955      History Obtained From:  patient.        HISTORY OF PRESENT ILLNESS:                The patient is a 69 y.o. male with significant past medical history as below who presents for Flexible sigmoidoscopy with biopsy.    Indication : Carcinoma rectum.    Past Medical History:        Diagnosis Date    Avascular necrosis (HCC)     \"Both Hips\"    Bronchitis Last Episode In 1960's Or 1970's    Colon cancer (HCC)     GERD (gastroesophageal reflux disease)     [place] (hard of hearing)     Bilateral Ears    Irregular heart beat     No Cardiologist At This Time--pt states due to caffeine use    Osteoarthritis     \"Hips\"    Psoriasis     Rectal mass 2025    Found during colonoscopy    Shingles Dx     \"Right Side Around To Back\"    Teeth missing     Upper And Lower    Wears glasses        Past Surgical History:        Procedure Laterality Date    COLONOSCOPY N/A 3/5/2025    COLONOSCOPY POLYPECTOMY SNARE/BIOPSY performed by Hilton Ko MD at [facility] ENDOSCOPY    DENTAL SURGERY      Teeth Extracted In Past    OTHER SURGICAL HISTORY Bilateral 2015    Intra Articular Steroid Injection Bilateral  Hips    TIBIA FRACTURE SURGERY Left 2018    IM nail insertion tibia    TONSILLECTOMY      TOTAL HIP ARTHROPLASTY Left 2015    TOTAL HIP ARTHROPLASTY Right 2015         Current Medications:    Medications    Prior to Admission medications    Medication Sig Start Date End Date Taking? Authorizing Provider   polyethylene glycol (GLYCOLAX) 17 GM/SCOOP powder Take 17 g by mouth daily 3/24/25 9/20/25 Yes Hilton Ko MD   ferrous sulfate (IRON 325) 325 (65 Fe) MG tablet Take 1 tablet by mouth daily (with breakfast) 3/24/25   Guero Guadarrama MD   Omeprazole-Sodium Bicarbonate (ZEGERID PO) Take by mouth daily    ProviderRio MD      Scheduled Medications:    sodium chloride flush  5-40 mL IntraVENous

## 2025-03-28 NOTE — ANESTHESIA POSTPROCEDURE EVALUATION
Department of Anesthesiology  Postprocedure Note    Patient: Tony Reis  MRN: [id number]  YOB: 1955  Date of evaluation: 3/28/2025    Procedure Summary       Date: 03/28/25 Room / Location: [place] [id number] / [facility]    Anesthesia Start: 1308 Anesthesia Stop: 1329    Procedure: SIGMOIDOSCOPY BIOPSY FLEXIBLE Diagnosis:       Carcinoma of rectum      (Carcinoma of rectum (HCC) [C20])    Surgeons: Hilton Ko MD Responsible Provider: Fina Denson MD    Anesthesia Type: MAC ASA Status: 2            Anesthesia Type: No value filed.    Rip Phase I: Rip Score: 10    Rip Phase II:      Anesthesia Post Evaluation    Patient location during evaluation: bedside  Patient participation: complete - patient participated  Level of consciousness: awake and alert  Airway patency: patent  Nausea & Vomiting: no nausea and no vomiting  Cardiovascular status: hemodynamically stable and blood pressure returned to baseline  Respiratory status: spontaneous ventilation, room air and acceptable  Hydration status: euvolemic  Pain management: adequate    No notable events documented.

## 2025-04-01 LAB — SURGICAL PATHOLOGY REPORT: NORMAL

## 2025-04-03 PROBLEM — D12.6 TUBULOVILLOUS ADENOMA OF COLON: Status: ACTIVE | Noted: 2025-04-03

## 2025-04-07 ENCOUNTER — TELEPHONE (OUTPATIENT)
Dept: GASTROENTEROLOGY | Age: 70
End: 2025-04-07

## 2025-04-07 ENCOUNTER — TELEPHONE (OUTPATIENT)
Dept: RADIATION ONCOLOGY | Age: 70
End: 2025-04-07

## 2025-04-07 ENCOUNTER — CLINICAL DOCUMENTATION (OUTPATIENT)
Dept: RADIATION ONCOLOGY | Age: 70
End: 2025-04-07

## 2025-04-07 NOTE — TELEPHONE ENCOUNTER
Called pt with appt time for Dr. Vogel. Pt scheduled for 4/14/2025 at 1:15 PM. Pt to have CT/SIM to follow appt. Pt verbalizes understanding.

## 2025-04-07 NOTE — PROGRESS NOTES
Called pt to schedule CT/SIM. Pt to return to [facility] on 4/14/2025 at 2 PM for CT/SIM for radiation planning. Pt to arrive at 1:15 PM for an appt with Dr. Vogel and be NPO x 4 hours prior for IV contrast. Pt denies any Iodine allergy and no previous reactions to IV dye. Lab for GFR completed on 3/21/2025 and WNL. Pt verbalizes understanding.

## 2025-04-09 ENCOUNTER — HOSPITAL ENCOUNTER (OUTPATIENT)
Dept: CT IMAGING | Age: 70
Discharge: HOME | End: 2025-04-09
Attending: INTERNAL MEDICINE
Payer: MEDICARE

## 2025-04-09 DIAGNOSIS — D49.0 RECTAL NEOPLASM: ICD-10-CM

## 2025-04-09 PROCEDURE — 2500000003 HC RX 250 WO HCPCS: Performed by: INTERNAL MEDICINE

## 2025-04-09 PROCEDURE — 6360000004 HC RX CONTRAST MEDICATION: Performed by: INTERNAL MEDICINE

## 2025-04-09 PROCEDURE — 71260 CT THORAX DX C+: CPT

## 2025-04-09 RX ORDER — SODIUM CHLORIDE 0.9 % (FLUSH) 0.9 %
20 SYRINGE (ML) INJECTION
Status: COMPLETED | OUTPATIENT
Start: 2025-04-09 | End: 2025-04-09

## 2025-04-09 RX ORDER — IOPAMIDOL 755 MG/ML
75 INJECTION, SOLUTION INTRAVASCULAR
Status: COMPLETED | OUTPATIENT
Start: 2025-04-09 | End: 2025-04-09

## 2025-04-09 RX ADMIN — IOPAMIDOL 75 ML: 755 INJECTION, SOLUTION INTRAVENOUS at 08:58

## 2025-04-09 RX ADMIN — SODIUM CHLORIDE, PRESERVATIVE FREE 20 ML: 5 INJECTION INTRAVENOUS at 09:05

## 2025-04-13 NOTE — PROGRESS NOTES
Patient Name:  Tony Reis  Patient :  1955  Patient MRN:  [id number]     Primary Oncologist: Guero Guadarrama MD  Referring Provider: Seun Cedeno MD     Date of Service: 2025      Chief Complaint:    Chief Complaint   Patient presents with    Follow-up     FU visit.     Patient Active Problem List:     Psoriasis     Irregular heart beat     GERD (gastroesophageal reflux disease)     History of total left hip arthroplasty     Pain and swelling of lower leg     History of total hip arthroplasty     Osteoarthritis     Status post total replacement of both hips     Dental caries     Open displaced oblique fracture of shaft of left tibia     Diarrhea     Foot swelling     Chronic diarrhea     Atherosclerosis of abdominal aorta     Carcinoma of rectum (HCC)     HPI:   Tomas Reis is a pleasant 68 yo male patient who was referred for evaluation of clinical rectal cancer.  Came in with significant other who stated that patient has had chronic diarrhea since 2024    1/10/25 CMP and CBC grossly wnl. Hep C Ab non reactive.  3/5/25 colonoscopy: The perianal exam was abnormal.  The digital rectal exam revealed a 1 cm (diameter) nodular, obstructing, hard and fixed rectal mass palpated 1.0 cm from the anal verge.  The mass was circumferential.  A malignant-appearing, intrinsic moderate stenosis measuring 8 cm (in length) x 12 mm (inner diameter) was found in the rectum and  was traversed.  A fungating, infiltrative and ulcerated partially obstructing large mass was found at 1 cm proximal to the anus and in the rectum. The mass was circumferential. The mass measured 8 cm (in length). Oozing was present. Biopsies were taken with a cold forceps for histology.  A few medium-mouthed diverticula were found in the sigmoid colon and descending colon.  A 4 mm polyp was found in the cecum. The polyp was sessile. The polyp was removed with a cold snare. Resection and retrieval were complete.  Impression:  Preparation

## 2025-04-14 ENCOUNTER — HOSPITAL ENCOUNTER (OUTPATIENT)
Dept: RADIATION ONCOLOGY | Age: 70
Discharge: HOME | End: 2025-04-14
Payer: MEDICARE

## 2025-04-14 ENCOUNTER — HOSPITAL ENCOUNTER (OUTPATIENT)
Dept: ULTRASOUND IMAGING | Age: 70
Discharge: HOME | End: 2025-04-14
Attending: INTERNAL MEDICINE
Payer: MEDICARE

## 2025-04-14 ENCOUNTER — OFFICE VISIT (OUTPATIENT)
Dept: ONCOLOGY | Age: 70
End: 2025-04-14
Payer: MEDICARE

## 2025-04-14 ENCOUNTER — HOSPITAL ENCOUNTER (OUTPATIENT)
Dept: INFUSION THERAPY | Age: 70
Discharge: HOME | End: 2025-04-14
Payer: MEDICARE

## 2025-04-14 VITALS
HEART RATE: 75 BPM | SYSTOLIC BLOOD PRESSURE: 127 MMHG | BODY MASS INDEX: 25.64 KG/M2 | OXYGEN SATURATION: 95 % | TEMPERATURE: 97.9 F | DIASTOLIC BLOOD PRESSURE: 82 MMHG | WEIGHT: 169.2 LBS | HEIGHT: 68 IN

## 2025-04-14 DIAGNOSIS — D49.0 RECTAL NEOPLASM: ICD-10-CM

## 2025-04-14 DIAGNOSIS — M79.89 LEG SWELLING: ICD-10-CM

## 2025-04-14 DIAGNOSIS — D49.0 RECTAL NEOPLASM: Primary | ICD-10-CM

## 2025-04-14 PROCEDURE — 99212 OFFICE O/P EST SF 10 MIN: CPT

## 2025-04-14 PROCEDURE — 1036F TOBACCO NON-USER: CPT | Performed by: INTERNAL MEDICINE

## 2025-04-14 PROCEDURE — 3017F COLORECTAL CA SCREEN DOC REV: CPT | Performed by: INTERNAL MEDICINE

## 2025-04-14 PROCEDURE — 1159F MED LIST DOCD IN RCRD: CPT | Performed by: INTERNAL MEDICINE

## 2025-04-14 PROCEDURE — G8419 CALC BMI OUT NRM PARAM NOF/U: HCPCS | Performed by: INTERNAL MEDICINE

## 2025-04-14 PROCEDURE — 1124F ACP DISCUSS-NO DSCNMKR DOCD: CPT | Performed by: INTERNAL MEDICINE

## 2025-04-14 PROCEDURE — 1125F AMNT PAIN NOTED PAIN PRSNT: CPT | Performed by: INTERNAL MEDICINE

## 2025-04-14 PROCEDURE — 99214 OFFICE O/P EST MOD 30 MIN: CPT | Performed by: INTERNAL MEDICINE

## 2025-04-14 PROCEDURE — G8427 DOCREV CUR MEDS BY ELIG CLIN: HCPCS | Performed by: INTERNAL MEDICINE

## 2025-04-14 PROCEDURE — 93971 EXTREMITY STUDY: CPT

## 2025-04-14 PROCEDURE — 77334 RADIATION TREATMENT AID(S): CPT | Performed by: RADIOLOGY

## 2025-04-14 RX ORDER — CAPECITABINE 500 MG/1
1000 TABLET, FILM COATED ORAL 2 TIMES DAILY
Qty: 100 TABLET | Refills: 0 | Status: ACTIVE | OUTPATIENT
Start: 2025-04-14 | End: 2025-05-09

## 2025-04-14 RX ORDER — CAPECITABINE 150 MG/1
600 TABLET, FILM COATED ORAL 2 TIMES DAILY
Qty: 224 TABLET | Refills: 0 | Status: SHIPPED | OUTPATIENT
Start: 2025-04-14 | End: 2025-04-14

## 2025-04-14 RX ORDER — IOPAMIDOL 612 MG/ML
100 INJECTION, SOLUTION INTRAVASCULAR
Status: DISCONTINUED | OUTPATIENT
Start: 2025-04-14 | End: 2025-04-15 | Stop reason: HOSPADM

## 2025-04-14 RX ORDER — SODIUM CHLORIDE 9 MG/ML
INJECTION, SOLUTION INTRAVENOUS ONCE
Status: DISCONTINUED | OUTPATIENT
Start: 2025-04-14 | End: 2025-04-15 | Stop reason: HOSPADM

## 2025-04-14 RX ORDER — CAPECITABINE 150 MG/1
600 TABLET, FILM COATED ORAL 2 TIMES DAILY
Qty: 200 TABLET | Refills: 0 | Status: ACTIVE | OUTPATIENT
Start: 2025-04-14 | End: 2025-05-09

## 2025-04-14 RX ORDER — SODIUM CHLORIDE 0.9 % (FLUSH) 0.9 %
5-40 SYRINGE (ML) INJECTION PRN
Status: DISCONTINUED | OUTPATIENT
Start: 2025-04-14 | End: 2025-04-15 | Stop reason: HOSPADM

## 2025-04-14 RX ORDER — CAPECITABINE 500 MG/1
1000 TABLET, FILM COATED ORAL 2 TIMES DAILY
Qty: 112 TABLET | Refills: 0 | Status: SHIPPED | OUTPATIENT
Start: 2025-04-14 | End: 2025-04-14

## 2025-04-14 NOTE — PROGRESS NOTES
MA Rooming Questions  Patient: Tony Reis  MRN: [id number]    Date: 4/14/2025        1. Do you have any new issues?   yes - states wants to discuss starting treatment.          2. Do you need any refills on medications?    no    3. Have you had any imaging done since your last visit?   yes - CT 4/9    4. Have you been hospitalized or seen in the emergency room since your last visit here?   no    5. Did the patient have a depression screening completed today? No    No data recorded     PHQ-9 Given to (if applicable):               PHQ-9 Score (if applicable):                     [] Positive     []  Negative              Does question #9 need addressed (if applicable)                     [] Yes    []  No               Brenda Daniels MA

## 2025-04-15 ENCOUNTER — CLINICAL DOCUMENTATION (OUTPATIENT)
Dept: ONCOLOGY | Age: 70
End: 2025-04-15

## 2025-04-15 DIAGNOSIS — D49.0 RECTAL NEOPLASM: Primary | ICD-10-CM

## 2025-04-15 NOTE — PROGRESS NOTES
Patient Assistance  Spoke with [facility] and patient needed financial help with his Capecitabine this month.  I called [employer] and they offered assistance and it will be covered for him and he can  at OP pharmacy in [facility] on 04-16-25.     Dominique Finney  Financial Navigator    Navigator Type: Infusion  Documentation Type: New Patient  Contact Type: Telephone  Status of Patient Insurance Coverage: Patient has active coverage  Form of PAP Assistance: Other  Patient Assistance Sponsor Name: [employer] Covered Cost this month        Drug Name: OTHER  Other Drug Name: Capecitbine  Form of PAP Assistance: Other

## 2025-04-15 NOTE — PROGRESS NOTES
Physician recommending to order HER-2 traci study and NGS from tissue (Rectum mass biopsy on 03/28/2025). Path # [id number]. Order placed in [facility] for CARIS NGS studies for further molecular testing to benefit and determine treatment planning for patient.     
98

## 2025-04-16 ENCOUNTER — TELEPHONE (OUTPATIENT)
Dept: INFUSION THERAPY | Age: 70
End: 2025-04-16

## 2025-04-17 DIAGNOSIS — C20 CARCINOMA OF RECTUM: Primary | ICD-10-CM

## 2025-04-18 ENCOUNTER — TELEPHONE (OUTPATIENT)
Dept: INFUSION THERAPY | Age: 70
End: 2025-04-18

## 2025-04-22 ENCOUNTER — CLINICAL SUPPORT (OUTPATIENT)
Dept: ONCOLOGY | Age: 70
End: 2025-04-22

## 2025-04-22 ENCOUNTER — HOSPITAL ENCOUNTER (OUTPATIENT)
Dept: INFUSION THERAPY | Age: 70
Discharge: HOME | End: 2025-04-22
Attending: INTERNAL MEDICINE
Payer: MEDICARE

## 2025-04-22 VITALS
OXYGEN SATURATION: 97 % | HEART RATE: 96 BPM | BODY MASS INDEX: 26.84 KG/M2 | DIASTOLIC BLOOD PRESSURE: 81 MMHG | WEIGHT: 171 LBS | SYSTOLIC BLOOD PRESSURE: 131 MMHG | HEIGHT: 67 IN | TEMPERATURE: 97.9 F

## 2025-04-22 DIAGNOSIS — Z11.59 NEED FOR HEPATITIS B SCREENING TEST: ICD-10-CM

## 2025-04-22 DIAGNOSIS — D49.0 RECTAL NEOPLASM: ICD-10-CM

## 2025-04-22 DIAGNOSIS — D49.0 RECTAL NEOPLASM: Primary | ICD-10-CM

## 2025-04-22 LAB
ALBUMIN SERPL-MCNC: 3.7 G/DL (ref 3.4–5)
ALBUMIN/GLOB SERPL: 1 {RATIO} (ref 1.1–2.2)
ALP SERPL-CCNC: 115 U/L (ref 40–129)
ALT SERPL-CCNC: <5 U/L (ref 10–40)
ANION GAP SERPL CALCULATED.3IONS-SCNC: 12 MMOL/L (ref 9–17)
AST SERPL-CCNC: 18 U/L (ref 15–37)
BASOPHILS # BLD: 0.04 K/UL
BASOPHILS NFR BLD: 1 % (ref 0–1)
BILIRUB SERPL-MCNC: 0.3 MG/DL (ref 0–1)
BUN SERPL-MCNC: 15 MG/DL (ref 7–20)
CALCIUM SERPL-MCNC: 8.8 MG/DL (ref 8.3–10.6)
CHLORIDE SERPL-SCNC: 103 MMOL/L (ref 99–110)
CO2 SERPL-SCNC: 22 MMOL/L (ref 21–32)
CREAT SERPL-MCNC: 0.6 MG/DL (ref 0.8–1.3)
EOSINOPHIL # BLD: 0.55 K/UL
EOSINOPHILS RELATIVE PERCENT: 7 % (ref 0–3)
ERYTHROCYTE [DISTWIDTH] IN BLOOD BY AUTOMATED COUNT: 16.4 % (ref 11.7–14.9)
GFR, ESTIMATED: >90 ML/MIN/1.73M2
GLUCOSE SERPL-MCNC: 96 MG/DL (ref 74–99)
HBV CORE AB SER QL: NONREACTIVE
HBV SURFACE AB SERPL IA-ACNC: 3.5 MIU/ML
HBV SURFACE AG SERPL QL IA: NONREACTIVE
HCT VFR BLD AUTO: 43.2 % (ref 42–52)
HGB BLD-MCNC: 13.3 G/DL (ref 13.5–18)
LYMPHOCYTES NFR BLD: 1.95 K/UL
LYMPHOCYTES RELATIVE PERCENT: 24 % (ref 24–44)
MCH RBC QN AUTO: 27.9 PG (ref 27–31)
MCHC RBC AUTO-ENTMCNC: 30.8 G/DL (ref 32–36)
MCV RBC AUTO: 90.6 FL (ref 78–100)
MONOCYTES NFR BLD: 0.84 K/UL
MONOCYTES NFR BLD: 10 % (ref 0–5)
NEUTROPHILS NFR BLD: 59 % (ref 36–66)
NEUTS SEG NFR BLD: 4.89 K/UL
PLATELET # BLD AUTO: 411 K/UL (ref 140–440)
PMV BLD AUTO: 9.2 FL (ref 7.5–11.1)
POTASSIUM SERPL-SCNC: 4.2 MMOL/L (ref 3.5–5.1)
PROT SERPL-MCNC: 7.2 G/DL (ref 6.4–8.2)
RBC # BLD AUTO: 4.77 M/UL (ref 4.6–6.2)
SODIUM SERPL-SCNC: 137 MMOL/L (ref 136–145)
WBC OTHER # BLD: 8.3 K/UL (ref 4–10.5)

## 2025-04-22 PROCEDURE — 86317 IMMUNOASSAY INFECTIOUS AGENT: CPT

## 2025-04-22 PROCEDURE — 36415 COLL VENOUS BLD VENIPUNCTURE: CPT

## 2025-04-22 PROCEDURE — 80053 COMPREHEN METABOLIC PANEL: CPT

## 2025-04-22 PROCEDURE — 99215 OFFICE O/P EST HI 40 MIN: CPT

## 2025-04-22 PROCEDURE — 87340 HEPATITIS B SURFACE AG IA: CPT

## 2025-04-22 PROCEDURE — 85025 COMPLETE CBC W/AUTO DIFF WBC: CPT

## 2025-04-22 PROCEDURE — 86704 HEP B CORE ANTIBODY TOTAL: CPT

## 2025-04-22 RX ORDER — ONDANSETRON 8 MG/1
8 TABLET, FILM COATED ORAL EVERY 8 HOURS PRN
Qty: 90 TABLET | Refills: 3 | Status: SHIPPED | OUTPATIENT
Start: 2025-04-22 | End: 2025-04-22

## 2025-04-22 RX ORDER — DEXAMETHASONE 4 MG/1
TABLET ORAL
Qty: 8 TABLET | Refills: 5 | Status: SHIPPED | OUTPATIENT
Start: 2025-04-22 | End: 2025-04-22

## 2025-04-22 RX ORDER — DEXAMETHASONE 4 MG/1
TABLET ORAL
Qty: 8 TABLET | Refills: 5 | Status: SHIPPED | OUTPATIENT
Start: 2025-04-22 | End: 2025-04-22 | Stop reason: CLARIF

## 2025-04-22 RX ORDER — ONDANSETRON 8 MG/1
8 TABLET, FILM COATED ORAL EVERY 8 HOURS PRN
Qty: 90 TABLET | Refills: 3 | Status: SHIPPED | OUTPATIENT
Start: 2025-04-22

## 2025-04-22 NOTE — PROGRESS NOTES
Patient arrived with friend, Lara for chemotherapy education. Discussed treatment plan, potential side effects, prevention and symptom management. Reviewed in detail chemotherapy education folder and drug monograph. Patient's regimen will be Capecitabine (Xeloda) 1,600 mg BID concurrent with RT. Patient [facility] and aware to take (2) 500 mg tablets along with (4) 150 mg tablets for total of 1,600 mg each dose. Consent signed by patient and will be scanned into patient's chart. Copy given to patient.     Instructed patient to take below medications as follows:     Zofran 8 mg - Take 1 tablet PO Q8 hours PRN for N/V   RX e-scribed to [facility] pharmacy on [facility]. Patient voices understanding on how and when to take medications. C1-C4 calendars provided including tx regimen, appointment times and written instructions. Contact information to [facility]/Physician After Hours number and this RN also provided to patient.     CBC, CMP and Hep B panel will be drawn today 04/22/2025. Confirmed first RT on 04/23/2025 at 1300. Patient to take first dose of oral chemo prior to RT.     Distress thermometer given to patient to fill out: 7/10. Patient aware of support services offered, but deferred any referrals at this time.  Copy given to Psychosocial Coordinator.    Encouraged patient to ask questions and allowed patient to express feelings during visit. All needs addressed. Patient denies any further questions or concerns at this time.

## 2025-04-22 NOTE — PROGRESS NOTES
FABIEN QUEZADA NOTE    Patient: Tony Reis  MRN: [id number]    Date: 4/22/2025        Treatment planning               Klarissa Mata CMA

## 2025-04-23 ENCOUNTER — CLINICAL DOCUMENTATION (OUTPATIENT)
Dept: RADIATION ONCOLOGY | Age: 70
End: 2025-04-23

## 2025-04-23 ENCOUNTER — HOSPITAL ENCOUNTER (OUTPATIENT)
Dept: RADIATION ONCOLOGY | Age: 70
Discharge: HOME | End: 2025-04-23
Payer: MEDICARE

## 2025-04-23 PROCEDURE — 77386 HC NTSTY MODUL RAD TX DLVR CPLX: CPT | Performed by: RADIOLOGY

## 2025-04-23 NOTE — PROGRESS NOTES
LSW spoke to Pt about issues noted on Distress Screening.  Pt lives in [place], is concerned about finances, and is currently receiving daily radiation treatments. Pt was provided a [facility] emergency assistance gasoline card.  Pt agreed to a support call from [facility] Counselor so a referral was made to [facility] Counselor Tejal Vaughn [facility].   Pt was given info on the Cancer Care Outreach Program and direct phone numbers for both LSW and Financial Navigator.

## 2025-04-24 ENCOUNTER — HOSPITAL ENCOUNTER (OUTPATIENT)
Dept: RADIATION ONCOLOGY | Age: 70
Discharge: HOME | End: 2025-04-24
Payer: MEDICARE

## 2025-04-24 PROCEDURE — 77386 HC NTSTY MODUL RAD TX DLVR CPLX: CPT | Performed by: RADIOLOGY

## 2025-04-25 ENCOUNTER — HOSPITAL ENCOUNTER (OUTPATIENT)
Dept: RADIATION ONCOLOGY | Age: 70
Discharge: HOME | End: 2025-04-25
Payer: MEDICARE

## 2025-04-28 ENCOUNTER — HOSPITAL ENCOUNTER (OUTPATIENT)
Dept: RADIATION ONCOLOGY | Age: 70
Discharge: HOME | End: 2025-04-28
Payer: MEDICARE

## 2025-04-28 PROCEDURE — 77386 HC NTSTY MODUL RAD TX DLVR CPLX: CPT | Performed by: RADIOLOGY

## 2025-04-28 PROCEDURE — 77014 CHG CT GUIDANCE RADIATION THERAPY FLDS PLACEMENT: CPT | Performed by: RADIOLOGY

## 2025-04-29 ENCOUNTER — HOSPITAL ENCOUNTER (OUTPATIENT)
Dept: RADIATION ONCOLOGY | Age: 70
Discharge: HOME | End: 2025-04-29
Payer: MEDICARE

## 2025-04-29 PROCEDURE — 77336 RADIATION PHYSICS CONSULT: CPT | Performed by: RADIOLOGY

## 2025-04-29 PROCEDURE — 77386 HC NTSTY MODUL RAD TX DLVR CPLX: CPT | Performed by: RADIOLOGY

## 2025-04-30 ENCOUNTER — HOSPITAL ENCOUNTER (OUTPATIENT)
Dept: INFUSION THERAPY | Age: 70
Discharge: HOME | End: 2025-04-30
Attending: INTERNAL MEDICINE
Payer: MEDICARE

## 2025-04-30 ENCOUNTER — OFFICE VISIT (OUTPATIENT)
Dept: ONCOLOGY | Age: 70
End: 2025-04-30
Payer: MEDICARE

## 2025-04-30 ENCOUNTER — HOSPITAL ENCOUNTER (OUTPATIENT)
Dept: RADIATION ONCOLOGY | Age: 70
Discharge: HOME | End: 2025-04-30
Payer: MEDICARE

## 2025-04-30 VITALS
HEART RATE: 94 BPM | TEMPERATURE: 97.8 F | BODY MASS INDEX: 27.31 KG/M2 | OXYGEN SATURATION: 97 % | WEIGHT: 174 LBS | DIASTOLIC BLOOD PRESSURE: 66 MMHG | SYSTOLIC BLOOD PRESSURE: 138 MMHG | HEIGHT: 67 IN

## 2025-04-30 DIAGNOSIS — D49.0 RECTAL NEOPLASM: Primary | ICD-10-CM

## 2025-04-30 DIAGNOSIS — D49.0 RECTAL NEOPLASM: ICD-10-CM

## 2025-04-30 DIAGNOSIS — C20 CARCINOMA OF RECTUM (HCC): Primary | ICD-10-CM

## 2025-04-30 LAB
ALBUMIN SERPL-MCNC: 3.4 G/DL (ref 3.4–5)
ALBUMIN/GLOB SERPL: 1.1 {RATIO} (ref 1.1–2.2)
ALP SERPL-CCNC: 99 U/L (ref 40–129)
ALT SERPL-CCNC: <5 U/L (ref 10–40)
ANION GAP SERPL CALCULATED.3IONS-SCNC: 12 MMOL/L (ref 9–17)
AST SERPL-CCNC: 17 U/L (ref 15–37)
BASOPHILS # BLD: 0.01 K/UL
BASOPHILS NFR BLD: 0 % (ref 0–1)
BILIRUB SERPL-MCNC: 0.2 MG/DL (ref 0–1)
BUN SERPL-MCNC: 13 MG/DL (ref 7–20)
CALCIUM SERPL-MCNC: 8.5 MG/DL (ref 8.3–10.6)
CHLORIDE SERPL-SCNC: 103 MMOL/L (ref 99–110)
CO2 SERPL-SCNC: 23 MMOL/L (ref 21–32)
CREAT SERPL-MCNC: 0.6 MG/DL (ref 0.8–1.3)
EOSINOPHIL # BLD: 0.36 K/UL
EOSINOPHILS RELATIVE PERCENT: 7 % (ref 0–3)
ERYTHROCYTE [DISTWIDTH] IN BLOOD BY AUTOMATED COUNT: 16.8 % (ref 11.7–14.9)
GFR, ESTIMATED: >90 ML/MIN/1.73M2
GLUCOSE SERPL-MCNC: 144 MG/DL (ref 74–99)
HCT VFR BLD AUTO: 40 % (ref 42–52)
HGB BLD-MCNC: 12.2 G/DL (ref 13.5–18)
LYMPHOCYTES NFR BLD: 0.97 K/UL
LYMPHOCYTES RELATIVE PERCENT: 19 % (ref 24–44)
MCH RBC QN AUTO: 28.1 PG (ref 27–31)
MCHC RBC AUTO-ENTMCNC: 30.5 G/DL (ref 32–36)
MCV RBC AUTO: 92.2 FL (ref 78–100)
MONOCYTES NFR BLD: 0.42 K/UL
MONOCYTES NFR BLD: 8 % (ref 0–5)
NEUTROPHILS NFR BLD: 65 % (ref 36–66)
NEUTS SEG NFR BLD: 3.24 K/UL
PLATELET # BLD AUTO: 331 K/UL (ref 140–440)
PMV BLD AUTO: 9.3 FL (ref 7.5–11.1)
POTASSIUM SERPL-SCNC: 4 MMOL/L (ref 3.5–5.1)
PROT SERPL-MCNC: 6.6 G/DL (ref 6.4–8.2)
RBC # BLD AUTO: 4.34 M/UL (ref 4.6–6.2)
SODIUM SERPL-SCNC: 138 MMOL/L (ref 136–145)
WBC OTHER # BLD: 5 K/UL (ref 4–10.5)

## 2025-04-30 PROCEDURE — 1036F TOBACCO NON-USER: CPT | Performed by: INTERNAL MEDICINE

## 2025-04-30 PROCEDURE — 80053 COMPREHEN METABOLIC PANEL: CPT

## 2025-04-30 PROCEDURE — 1159F MED LIST DOCD IN RCRD: CPT | Performed by: INTERNAL MEDICINE

## 2025-04-30 PROCEDURE — 1124F ACP DISCUSS-NO DSCNMKR DOCD: CPT | Performed by: INTERNAL MEDICINE

## 2025-04-30 PROCEDURE — 1126F AMNT PAIN NOTED NONE PRSNT: CPT | Performed by: INTERNAL MEDICINE

## 2025-04-30 PROCEDURE — 99214 OFFICE O/P EST MOD 30 MIN: CPT | Performed by: INTERNAL MEDICINE

## 2025-04-30 PROCEDURE — 85025 COMPLETE CBC W/AUTO DIFF WBC: CPT

## 2025-04-30 PROCEDURE — 2580000003 HC RX 258: Performed by: INTERNAL MEDICINE

## 2025-04-30 PROCEDURE — G8419 CALC BMI OUT NRM PARAM NOF/U: HCPCS | Performed by: INTERNAL MEDICINE

## 2025-04-30 PROCEDURE — G8427 DOCREV CUR MEDS BY ELIG CLIN: HCPCS | Performed by: INTERNAL MEDICINE

## 2025-04-30 PROCEDURE — 77386 HC NTSTY MODUL RAD TX DLVR CPLX: CPT | Performed by: RADIOLOGY

## 2025-04-30 PROCEDURE — 3017F COLORECTAL CA SCREEN DOC REV: CPT | Performed by: INTERNAL MEDICINE

## 2025-04-30 PROCEDURE — 96360 HYDRATION IV INFUSION INIT: CPT

## 2025-04-30 RX ORDER — 0.9 % SODIUM CHLORIDE 0.9 %
1000 INTRAVENOUS SOLUTION INTRAVENOUS ONCE
Status: COMPLETED | OUTPATIENT
Start: 2025-04-30 | End: 2025-04-30

## 2025-04-30 RX ORDER — SODIUM CHLORIDE 9 MG/ML
5-250 INJECTION, SOLUTION INTRAVENOUS PRN
OUTPATIENT
Start: 2025-04-30

## 2025-04-30 RX ORDER — SODIUM CHLORIDE 0.9 % (FLUSH) 0.9 %
5-40 SYRINGE (ML) INJECTION PRN
OUTPATIENT
Start: 2025-04-30

## 2025-04-30 RX ORDER — EPINEPHRINE 1 MG/ML
0.3 INJECTION, SOLUTION, CONCENTRATE INTRAVENOUS PRN
Status: CANCELLED | OUTPATIENT
Start: 2025-04-30

## 2025-04-30 RX ORDER — EPINEPHRINE 1 MG/ML
0.3 INJECTION, SOLUTION, CONCENTRATE INTRAVENOUS PRN
OUTPATIENT
Start: 2025-04-30

## 2025-04-30 RX ORDER — FAMOTIDINE 10 MG/ML
20 INJECTION, SOLUTION INTRAVENOUS
OUTPATIENT
Start: 2025-04-30

## 2025-04-30 RX ORDER — ALBUTEROL SULFATE 90 UG/1
4 INHALANT RESPIRATORY (INHALATION) PRN
OUTPATIENT
Start: 2025-04-30

## 2025-04-30 RX ORDER — 0.9 % SODIUM CHLORIDE 0.9 %
1000 INTRAVENOUS SOLUTION INTRAVENOUS ONCE
Status: CANCELLED | OUTPATIENT
Start: 2025-04-30 | End: 2025-04-30

## 2025-04-30 RX ORDER — ALBUTEROL SULFATE 90 UG/1
4 INHALANT RESPIRATORY (INHALATION) PRN
Status: CANCELLED | OUTPATIENT
Start: 2025-04-30

## 2025-04-30 RX ORDER — SODIUM CHLORIDE 9 MG/ML
INJECTION, SOLUTION INTRAVENOUS CONTINUOUS
OUTPATIENT
Start: 2025-04-30

## 2025-04-30 RX ORDER — ONDANSETRON 2 MG/ML
8 INJECTION INTRAMUSCULAR; INTRAVENOUS
Status: CANCELLED | OUTPATIENT
Start: 2025-04-30

## 2025-04-30 RX ORDER — FAMOTIDINE 10 MG/ML
20 INJECTION, SOLUTION INTRAVENOUS
Status: CANCELLED | OUTPATIENT
Start: 2025-04-30

## 2025-04-30 RX ORDER — ONDANSETRON 2 MG/ML
8 INJECTION INTRAMUSCULAR; INTRAVENOUS
OUTPATIENT
Start: 2025-04-30

## 2025-04-30 RX ORDER — HYDROCORTISONE SODIUM SUCCINATE 100 MG/2ML
100 INJECTION INTRAMUSCULAR; INTRAVENOUS
Status: CANCELLED | OUTPATIENT
Start: 2025-04-30

## 2025-04-30 RX ORDER — DIPHENHYDRAMINE HYDROCHLORIDE 50 MG/ML
50 INJECTION, SOLUTION INTRAMUSCULAR; INTRAVENOUS
Status: CANCELLED | OUTPATIENT
Start: 2025-04-30

## 2025-04-30 RX ORDER — ACETAMINOPHEN 325 MG/1
650 TABLET ORAL
Status: CANCELLED | OUTPATIENT
Start: 2025-04-30

## 2025-04-30 RX ORDER — HYDROCORTISONE SODIUM SUCCINATE 100 MG/2ML
100 INJECTION INTRAMUSCULAR; INTRAVENOUS
OUTPATIENT
Start: 2025-04-30

## 2025-04-30 RX ORDER — SODIUM CHLORIDE 9 MG/ML
5-250 INJECTION, SOLUTION INTRAVENOUS PRN
Status: CANCELLED | OUTPATIENT
Start: 2025-04-30

## 2025-04-30 RX ORDER — HEPARIN 100 UNIT/ML
500 SYRINGE INTRAVENOUS PRN
OUTPATIENT
Start: 2025-04-30

## 2025-04-30 RX ORDER — DIPHENHYDRAMINE HYDROCHLORIDE 50 MG/ML
50 INJECTION, SOLUTION INTRAMUSCULAR; INTRAVENOUS
OUTPATIENT
Start: 2025-04-30

## 2025-04-30 RX ORDER — ACETAMINOPHEN 325 MG/1
650 TABLET ORAL
OUTPATIENT
Start: 2025-04-30

## 2025-04-30 RX ORDER — HEPARIN 100 UNIT/ML
500 SYRINGE INTRAVENOUS PRN
Status: CANCELLED | OUTPATIENT
Start: 2025-04-30

## 2025-04-30 RX ORDER — SODIUM CHLORIDE 0.9 % (FLUSH) 0.9 %
5-40 SYRINGE (ML) INJECTION PRN
Status: CANCELLED | OUTPATIENT
Start: 2025-04-30

## 2025-04-30 RX ORDER — SODIUM CHLORIDE 9 MG/ML
INJECTION, SOLUTION INTRAVENOUS CONTINUOUS
Status: CANCELLED | OUTPATIENT
Start: 2025-04-30

## 2025-04-30 RX ADMIN — SODIUM CHLORIDE 1000 ML: 0.9 INJECTION, SOLUTION INTRAVENOUS at 14:17

## 2025-04-30 NOTE — PROGRESS NOTES
MA Rooming Questions  Patient: Tony Reis  MRN: [id number]    Date: 4/30/2025        1. Do you have any new issues?   no         2. Do you need any refills on medications?    no    3. Have you had any imaging done since your last visit?   no    4. Have you been hospitalized or seen in the emergency room since your last visit here?   no    5. Did the patient have a depression screening completed today? No    No data recorded     PHQ-9 Given to (if applicable):               PHQ-9 Score (if applicable):                     [] Positive     []  Negative              Does question #9 need addressed (if applicable)                     [] Yes    []  No               Brenda Daniels MA      
°C) (Infrared)   Ht 1.702 m (5' 7\")   Wt 78.9 kg (174 lb)   SpO2 97%   BMI 27.25 kg/m²      Physical Exam:  CONSTITUTIONAL: awake, alert, cooperative, no apparent distress   EYES: pupils equal, round and reactive to light, sclera clear and + pallor.  ENT: Normocephalic, without obvious abnormality, atraumatic  Dry oral mucosa.  NECK: supple, symmetrical, no jugular venous distension and no carotid bruits   HEMATOLOGIC/LYMPHATIC: no cervical, supraclavicular or axillary LAD  LUNGS: no increased work of breathing. CTA bilaterally.   CARDIOVASCULAR: regular rate and rhythm, normal S1 and S2, no murmur   ABDOMEN: BS normal, soft, non-distended, non-tender, no masses palpated, no hepatosplenomegaly   MUSCULOSKELETAL: full range of motion noted, tone is normal  NEUROLOGIC: CN II - XII grossly intact. Motor skills grossly intact.   SKIN: No jaundice. Decreased skin turgor.  EXTREMITIES: Chronic LLE swelling. Homans negative.     Labs:  Hematology:  Lab Results   Component Value Date    WBC 5.0 04/30/2025    RBC 4.34 (L) 04/30/2025    HGB 12.2 (L) 04/30/2025    HCT 40.0 (L) 04/30/2025    MCV 92.2 04/30/2025    MCH 28.1 04/30/2025    MCHC 30.5 (L) 04/30/2025    RDW 16.8 (H) 04/30/2025     04/30/2025    MPV 9.3 04/30/2025    BASOPCT 0 04/30/2025    LYMPHOPCT 19 (L) 04/30/2025    MONOPCT 8 (H) 04/30/2025    EOSABS 0.36 04/30/2025    BASOSABS 0.01 04/30/2025    LYMPHSABS 0.97 04/30/2025    MONOSABS 0.42 04/30/2025    DIFFTYPE AUTOMATED DIFFERENTIAL 06/28/2018     Chemistry:  Lab Results   Component Value Date     04/30/2025    K 4.0 04/30/2025     04/30/2025    CO2 23 04/30/2025    BUN 13 04/30/2025    CREATININE 0.6 (L) 04/30/2025    GLUCOSE 144 (H) 04/30/2025    CALCIUM 8.5 04/30/2025    BILITOT 0.2 04/30/2025    ALKPHOS 99 04/30/2025    AST 17 04/30/2025    ALT <5 (L) 04/30/2025    LABGLOM >90 04/30/2025    GFRAA >60 06/29/2018    AGRATIO 1.4 01/10/2025     Coagulation Panel:  Lab Results   Component

## 2025-04-30 NOTE — PROGRESS NOTES
Pt to infusion suite for hydration, per DR. Jack. Pt states that he is currently taking oral chemo and getting radiation. Pt states that he feels fatigued, but okay. PIV inserted with brisk blood return.   Hydration given, pt tolerated well. Left ambulatory with d'c paperwork. Follow up appt made for two weeks. RTC 5/1 for RT.

## 2025-05-01 ENCOUNTER — HOSPITAL ENCOUNTER (OUTPATIENT)
Dept: RADIATION ONCOLOGY | Age: 70
Discharge: HOME | End: 2025-05-01
Payer: MEDICARE

## 2025-05-01 PROCEDURE — 77386 HC NTSTY MODUL RAD TX DLVR CPLX: CPT | Performed by: RADIOLOGY

## 2025-05-02 ENCOUNTER — APPOINTMENT (OUTPATIENT)
Dept: RADIATION ONCOLOGY | Age: 70
End: 2025-05-02
Payer: MEDICARE

## 2025-05-02 LAB
CARIS GENOMIC LOH - EXOME: NORMAL
CARIS HLA-A: NORMAL
CARIS HLA-B: NORMAL
CARIS HLA-C: NORMAL
CARIS MSI - EXOME: NORMAL
CARIS TMB - EXOME: NORMAL

## 2025-05-04 NOTE — PROGRESS NOTES
Patient Name:  Tony Reis  Patient :  1955  Patient MRN:  [id number]     Primary Oncologist: Guero Guadarrama MD  Referring Provider: Seun Cedeno MD     Date of Service: 2025      Chief Complaint:    Chief Complaint   Patient presents with    Follow-up    Results     FU visit.     Patient Active Problem List:     Psoriasis     Irregular heart beat     GERD (gastroesophageal reflux disease)     History of total left hip arthroplasty     Pain and swelling of lower leg     History of total hip arthroplasty     Osteoarthritis     Status post total replacement of both hips     Dental caries     Open displaced oblique fracture of shaft of left tibia     Diarrhea     Foot swelling     Chronic diarrhea     Atherosclerosis of abdominal aorta     Carcinoma of rectum (HCC)     HPI:   Tomas Reis is a pleasant 70 yo male patient who was referred for evaluation of clinical rectal cancer.  He came in with significant other who stated that patient has had chronic diarrhea since 2024    1/10/25 CMP and CBC grossly wnl. Hep C Ab non reactive.  3/5/25 colonoscopy: The perianal exam was abnormal.  The digital rectal exam revealed a 1 cm (diameter) nodular, obstructing, hard and fixed rectal mass palpated 1.0 cm from the anal verge.  The mass was circumferential.  A malignant-appearing, intrinsic moderate stenosis measuring 8 cm (in length) x 12 mm (inner diameter) was found in the rectum and  was traversed.  A fungating, infiltrative and ulcerated partially obstructing large mass was found at 1 cm proximal to the anus and in the rectum. The mass was circumferential. The mass measured 8 cm (in length). Oozing was present. Biopsies were taken with a cold forceps for histology.  A few medium-mouthed diverticula were found in the sigmoid colon and descending colon.  A 4 mm polyp was found in the cecum. The polyp was sessile. The polyp was removed with a cold snare. Resection and retrieval were

## 2025-05-05 ENCOUNTER — HOSPITAL ENCOUNTER (OUTPATIENT)
Dept: RADIATION ONCOLOGY | Age: 70
Discharge: HOME | End: 2025-05-05
Payer: MEDICARE

## 2025-05-05 PROCEDURE — 77386 HC NTSTY MODUL RAD TX DLVR CPLX: CPT | Performed by: RADIOLOGY

## 2025-05-06 ENCOUNTER — HOSPITAL ENCOUNTER (OUTPATIENT)
Dept: RADIATION ONCOLOGY | Age: 70
Discharge: HOME | End: 2025-05-06
Payer: MEDICARE

## 2025-05-06 ENCOUNTER — CLINICAL DOCUMENTATION (OUTPATIENT)
Dept: ONCOLOGY | Age: 70
End: 2025-05-06

## 2025-05-06 VITALS — BODY MASS INDEX: 27.28 KG/M2 | WEIGHT: 174.2 LBS

## 2025-05-06 PROCEDURE — 77386 HC NTSTY MODUL RAD TX DLVR CPLX: CPT | Performed by: RADIOLOGY

## 2025-05-06 ASSESSMENT — PAIN SCALES - GENERAL: PAINLEVEL_OUTOF10: 0

## 2025-05-06 NOTE — PROGRESS NOTES
Oncology Dietitian Initial Nutrition Assessment   [facility]  1:30 PM  5/6/25    Type of visit: Initial    RD Recommendations/Nutrition Goals:   Diet: Low fiber diet with soluble fiber   Encourage to lower high fat foods and lactose-rich foods. Consider lactase enzymes prn  Recommend to trial lactose-free high protein supplements as discussed   Encourage adequate hydration with electrolyte intake   Monitor GI tolerance, lytes, diet recall, fluids, POC      Reason for visit:    Carcinoma of Rectum   Improve nutritional status during and after radiation treatment   Weight Maintenance throughout treatment   Improve treatment tolerance   Minimize short-term and long-term treatment side effects     Subjective/Objective:     Discussed role of oncology dietitian in providing supportive nutrition care.Explained that RD is available to be a resource for managing symptoms, minimizing weight changes and maintaining optimal nutrition status during and after cancer treatment.     Nutrition Risk Questionnaire (PG-SGA, SF)~ Filled out by patient at visit  Symptoms: the following problems have kept the patient from eating enough during the past two weeks (highlight all that apply)  ? No problems eating(0) ? No appetite, just did not feel like eating(3)   ? Nausea(1)   ? Vomiting(3)   ? Constipation(1)   X Diarrhea(3)   ? Mouth Sores(2)  ? Dry mouth(1)   ? Problems swallowing(2) ? Feels full quickly(1)  ? Fatigue(1)    ? Pain (3) where?  ? Things taste funny or have no taste(1)  ? Other**(1)   **Examples: depression, money or dental issues  Symptom score: 3    Food Intake: As compared to normal intake, the patient would rate food intake during the past month as:   Less than usual (1)  X Unchanged (0) More than usual (0)          I am now taking:   ? Normal Food but less than normal amount (1)  ? Little solid food (2)         ? Only Liquids (3) ? Only nutrition supplements (3)    ? Very little of

## 2025-05-06 NOTE — PROGRESS NOTES
Weekly Radiation Treatment Progress Note    DATE OF SERVICE: 5/6/2025     DIAGNOSIS:  likely adenocarcinoma of the rectum [id number]        TREATMENT COURSE:     Concurrent chemo    Site: pelvis   Current Total Radiation Dose: 1620 cGy  Fraction: 9/28    Some fatigue. Tolerating xeloda. No new problems or issues.         EXAM  Wt Readings from Last 3 Encounters:   04/30/25 78.9 kg (174 lb)   04/29/25 78.3 kg (172 lb 9.6 oz)   04/22/25 77.6 kg (171 lb)     NAD      Setup images, chart, plan reviewed    A/P:   Tolerating RT well  Continue RT as planned      Electronically signed by Tate Puga MD on 5/6/2025 at 12:58 PM

## 2025-05-07 ENCOUNTER — HOSPITAL ENCOUNTER (OUTPATIENT)
Dept: RADIATION ONCOLOGY | Age: 70
Discharge: HOME | End: 2025-05-07
Payer: MEDICARE

## 2025-05-07 PROCEDURE — 77386 HC NTSTY MODUL RAD TX DLVR CPLX: CPT | Performed by: RADIOLOGY

## 2025-05-07 PROCEDURE — 77336 RADIATION PHYSICS CONSULT: CPT | Performed by: RADIOLOGY

## 2025-05-08 ENCOUNTER — HOSPITAL ENCOUNTER (OUTPATIENT)
Dept: RADIATION ONCOLOGY | Age: 70
Discharge: HOME | End: 2025-05-08
Payer: MEDICARE

## 2025-05-08 PROCEDURE — 77386 HC NTSTY MODUL RAD TX DLVR CPLX: CPT | Performed by: RADIOLOGY

## 2025-05-09 ENCOUNTER — HOSPITAL ENCOUNTER (OUTPATIENT)
Dept: RADIATION ONCOLOGY | Age: 70
Discharge: HOME | End: 2025-05-09
Payer: MEDICARE

## 2025-05-09 PROCEDURE — 77338 DESIGN MLC DEVICE FOR IMRT: CPT | Performed by: RADIOLOGY

## 2025-05-09 PROCEDURE — 77300 RADIATION THERAPY DOSE PLAN: CPT | Performed by: RADIOLOGY

## 2025-05-09 PROCEDURE — 77386 HC NTSTY MODUL RAD TX DLVR CPLX: CPT | Performed by: RADIOLOGY

## 2025-05-09 NOTE — PROGRESS NOTES
Pt to exam room following radiation today, c/o increased diarrhea stools. Pt reports several loose stools per day. Pt voices has Imodium but hasn't been taking it since chemoradiation started. Pt advised to take imodium 2 tabs after 1st loose stool then may take one tablet after each stool, max 8/day as ordered by Dr. Puga. Encouraged to push fluids to stay hydrated. Pt to notify radiation staff if no improvement.

## 2025-05-12 ENCOUNTER — HOSPITAL ENCOUNTER (OUTPATIENT)
Dept: RADIATION ONCOLOGY | Age: 70
End: 2025-05-12
Payer: MEDICARE

## 2025-05-13 ENCOUNTER — HOSPITAL ENCOUNTER (OUTPATIENT)
Dept: RADIATION ONCOLOGY | Age: 70
Discharge: HOME | End: 2025-05-13
Payer: MEDICARE

## 2025-05-13 ENCOUNTER — CLINICAL DOCUMENTATION (OUTPATIENT)
Dept: ONCOLOGY | Age: 70
End: 2025-05-13

## 2025-05-13 VITALS — WEIGHT: 169 LBS | BODY MASS INDEX: 26.47 KG/M2

## 2025-05-13 PROCEDURE — 77386 HC NTSTY MODUL RAD TX DLVR CPLX: CPT | Performed by: RADIOLOGY

## 2025-05-13 RX ORDER — LOPERAMIDE HYDROCHLORIDE 2 MG/1
2 CAPSULE ORAL PRN
COMMUNITY

## 2025-05-13 ASSESSMENT — PAIN SCALES - GENERAL: PAINLEVEL_OUTOF10: 0

## 2025-05-13 NOTE — PROGRESS NOTES
Oncology Dietitian Nutrition Assessment   [facility]  1:30 PM  5/13/25    Type of visit: Follow Up  RD Recommendations/Nutrition Goals:   Diet: Low fiber diet with soluble fiber   Take imodium up to 8x/day per MD/nsg   Continue lower high fat foods and lactose-rich foods. Consider lactase enzymes prn  Trial lactose-free high protein supplements as discussed   Encourage adequate hydration with electrolyte intake, aim 2.4L per day fluids   Monitor GI tolerance, lytes, diet recall, fluids, POC      Reason for visit:    Carcinoma of Rectum   Improve nutritional status during and after radiation treatment   Weight Maintenance throughout treatment   Improve treatment tolerance   Minimize short-term and long-term treatment side effects     Nutrition Assessment:   PG-SGA (SF) total score: 5, low malnutrition risk    Malnutrition Status NFPE performed (5/6/25)  Upon start of treatment/during: No  Level: At risk  Meets 2 of 6 ASPEN criteria: No  Energy intake: No - Noted diarrhea possible increased nutrient needs  Unintentional weight loss: No   3. Muscle loss: No- areas include temples, clavicles, hands, quadriceps, calves, scapula   4. Fat loss: No - areas include orbital, buccal, ribs, triceps  5. Hand  strength: No  6. Edema: Yes in BLE    Diagnosis:  Rectal Cancer  Treatment: Radiation, Xeloda  Possible Side Effects: rxchemo: Xeloda (Capecitabine)  - side effects may include Abdominal pain, anorexia, constipation, decreased appetite , diarrhea, nausea, stomatitis, vomiting, anemia, fatigue, fever     Pertinent history/Meds:  Past Medical History:   Diagnosis Date    Avascular necrosis (HCC)     \"Both Hips\"    Bronchitis Last Episode In 1960's Or 1970's    Colon cancer (HCC)     GERD (gastroesophageal reflux disease)     [place] (hard of hearing)     Bilateral Ears    Irregular heart beat     No Cardiologist At This Time--pt states due to caffeine use    Osteoarthritis     \"Hips\"

## 2025-05-13 NOTE — PROGRESS NOTES
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 Loose bowel movements. Took 6 imodium yesterday and did okay.       EXAM  Wt Readings from Last 3 Encounters:   05/06/25 79 kg (174 lb 3.2 oz)   04/30/25 78.9 kg (174 lb)   04/29/25 78.3 kg (172 lb 9.6 oz)     NAD      Setup images, chart, plan reviewed    A/P:   Tolerating RT well  Continue RT as planned  Can take up to 8 imodium a day. If that doesn't work will try lomotil. Encouraged hydration to keep bladder full for treatments.     Electronically signed by Tate Puga MD on 5/13/2025 at 12:50 PM

## 2025-05-13 NOTE — PLAN OF CARE
Care plan reviewed.    - Pt reports having several loose stools per day, taking Imodium and helping some. Pt took Imodium x 6 tabs yesterday and it helped. Loose stools returned this AM, pt states will take Imodium tomorrow AM when he wakes to see if that helps. Dr. Puga aware of above.    _ Reviewed with pt, the importance of having a full bladder, pt instructed on bladder prep- pt voices understanding.     _ Energy down some, tolerating ADLs without difficulty with periods of rest.

## 2025-05-14 ENCOUNTER — HOSPITAL ENCOUNTER (OUTPATIENT)
Dept: INFUSION THERAPY | Age: 70
Discharge: HOME | End: 2025-05-14
Attending: INTERNAL MEDICINE
Payer: MEDICARE

## 2025-05-14 ENCOUNTER — OFFICE VISIT (OUTPATIENT)
Dept: ONCOLOGY | Age: 70
End: 2025-05-14
Payer: MEDICARE

## 2025-05-14 ENCOUNTER — HOSPITAL ENCOUNTER (OUTPATIENT)
Dept: RADIATION ONCOLOGY | Age: 70
Discharge: HOME | End: 2025-05-14
Payer: MEDICARE

## 2025-05-14 VITALS
OXYGEN SATURATION: 99 % | WEIGHT: 168 LBS | DIASTOLIC BLOOD PRESSURE: 72 MMHG | TEMPERATURE: 97.9 F | SYSTOLIC BLOOD PRESSURE: 125 MMHG | HEIGHT: 67 IN | BODY MASS INDEX: 26.37 KG/M2 | HEART RATE: 96 BPM

## 2025-05-14 DIAGNOSIS — D49.0 RECTAL NEOPLASM: ICD-10-CM

## 2025-05-14 DIAGNOSIS — D49.0 RECTAL NEOPLASM: Primary | ICD-10-CM

## 2025-05-14 LAB
ALBUMIN SERPL-MCNC: 3.4 G/DL (ref 3.4–5)
ALBUMIN/GLOB SERPL: 1.1 {RATIO} (ref 1.1–2.2)
ALP SERPL-CCNC: 91 U/L (ref 40–129)
ALT SERPL-CCNC: 6 U/L (ref 10–40)
ANION GAP SERPL CALCULATED.3IONS-SCNC: 12 MMOL/L (ref 9–17)
AST SERPL-CCNC: 18 U/L (ref 15–37)
BASOPHILS # BLD: 0.01 K/UL
BASOPHILS NFR BLD: 0 % (ref 0–1)
BILIRUB SERPL-MCNC: 0.4 MG/DL (ref 0–1)
BUN SERPL-MCNC: 10 MG/DL (ref 7–20)
CALCIUM SERPL-MCNC: 8.9 MG/DL (ref 8.3–10.6)
CHLORIDE SERPL-SCNC: 100 MMOL/L (ref 99–110)
CO2 SERPL-SCNC: 24 MMOL/L (ref 21–32)
CREAT SERPL-MCNC: 0.7 MG/DL (ref 0.8–1.3)
EOSINOPHIL # BLD: 0.3 K/UL
EOSINOPHILS RELATIVE PERCENT: 6 % (ref 0–3)
ERYTHROCYTE [DISTWIDTH] IN BLOOD BY AUTOMATED COUNT: 20 % (ref 11.7–14.9)
GFR, ESTIMATED: >90 ML/MIN/1.73M2
GLUCOSE SERPL-MCNC: 97 MG/DL (ref 74–99)
HCT VFR BLD AUTO: 39.3 % (ref 42–52)
HGB BLD-MCNC: 12.3 G/DL (ref 13.5–18)
LYMPHOCYTES NFR BLD: 0.39 K/UL
LYMPHOCYTES RELATIVE PERCENT: 8 % (ref 24–44)
MCH RBC QN AUTO: 29.1 PG (ref 27–31)
MCHC RBC AUTO-ENTMCNC: 31.3 G/DL (ref 32–36)
MCV RBC AUTO: 93.1 FL (ref 78–100)
MONOCYTES NFR BLD: 0.67 K/UL
MONOCYTES NFR BLD: 13 % (ref 0–5)
NEUTROPHILS NFR BLD: 74 % (ref 36–66)
NEUTS SEG NFR BLD: 3.8 K/UL
PLATELET # BLD AUTO: 285 K/UL (ref 140–440)
PMV BLD AUTO: 9.1 FL (ref 7.5–11.1)
POTASSIUM SERPL-SCNC: 4.1 MMOL/L (ref 3.5–5.1)
PROT SERPL-MCNC: 6.6 G/DL (ref 6.4–8.2)
RBC # BLD AUTO: 4.22 M/UL (ref 4.6–6.2)
SODIUM SERPL-SCNC: 136 MMOL/L (ref 136–145)
WBC OTHER # BLD: 5.2 K/UL (ref 4–10.5)

## 2025-05-14 PROCEDURE — 77386 HC NTSTY MODUL RAD TX DLVR CPLX: CPT | Performed by: RADIOLOGY

## 2025-05-14 PROCEDURE — 99212 OFFICE O/P EST SF 10 MIN: CPT

## 2025-05-14 PROCEDURE — 1124F ACP DISCUSS-NO DSCNMKR DOCD: CPT | Performed by: INTERNAL MEDICINE

## 2025-05-14 PROCEDURE — 1036F TOBACCO NON-USER: CPT | Performed by: INTERNAL MEDICINE

## 2025-05-14 PROCEDURE — 36415 COLL VENOUS BLD VENIPUNCTURE: CPT

## 2025-05-14 PROCEDURE — 85025 COMPLETE CBC W/AUTO DIFF WBC: CPT

## 2025-05-14 PROCEDURE — 1159F MED LIST DOCD IN RCRD: CPT | Performed by: INTERNAL MEDICINE

## 2025-05-14 PROCEDURE — 99213 OFFICE O/P EST LOW 20 MIN: CPT | Performed by: INTERNAL MEDICINE

## 2025-05-14 PROCEDURE — G8419 CALC BMI OUT NRM PARAM NOF/U: HCPCS | Performed by: INTERNAL MEDICINE

## 2025-05-14 PROCEDURE — G8427 DOCREV CUR MEDS BY ELIG CLIN: HCPCS | Performed by: INTERNAL MEDICINE

## 2025-05-14 PROCEDURE — 1126F AMNT PAIN NOTED NONE PRSNT: CPT | Performed by: INTERNAL MEDICINE

## 2025-05-14 PROCEDURE — 3017F COLORECTAL CA SCREEN DOC REV: CPT | Performed by: INTERNAL MEDICINE

## 2025-05-14 PROCEDURE — 80053 COMPREHEN METABOLIC PANEL: CPT

## 2025-05-14 ASSESSMENT — PATIENT HEALTH QUESTIONNAIRE - PHQ9
1. LITTLE INTEREST OR PLEASURE IN DOING THINGS: NOT AT ALL
2. FEELING DOWN, DEPRESSED OR HOPELESS: NOT AT ALL
SUM OF ALL RESPONSES TO PHQ QUESTIONS 1-9: 0

## 2025-05-14 NOTE — PROGRESS NOTES
MA Rooming Questions  Patient: Tony Ries  MRN: [id number]    Date: 5/14/2025        1. Do you have any new issues?   yes - PATIENT HAS A DARK THUMBNAIL ON RIGHT HAND         2. Do you need any refills on medications?    no    3. Have you had any imaging done since your last visit?   no    4. Have you been hospitalized or seen in the emergency room since your last visit here?   no    5. Did the patient have a depression screening completed today? Yes    PHQ-9 Total Score: 0 (5/14/2025  1:28 PM)       PHQ-9 Given to (if applicable):               PHQ-9 Score (if applicable):                     [] Positive     []  Negative              Does question #9 need addressed (if applicable)                     [] Yes    []  No               Lizabeth Montero CMA

## 2025-05-15 ENCOUNTER — HOSPITAL ENCOUNTER (OUTPATIENT)
Dept: RADIATION ONCOLOGY | Age: 70
Discharge: HOME | End: 2025-05-15
Payer: MEDICARE

## 2025-05-15 PROCEDURE — 77386 HC NTSTY MODUL RAD TX DLVR CPLX: CPT | Performed by: RADIOLOGY

## 2025-05-15 PROCEDURE — 77336 RADIATION PHYSICS CONSULT: CPT | Performed by: RADIOLOGY

## 2025-05-16 ENCOUNTER — HOSPITAL ENCOUNTER (OUTPATIENT)
Dept: RADIATION ONCOLOGY | Age: 70
Discharge: HOME | End: 2025-05-16
Payer: MEDICARE

## 2025-05-16 PROCEDURE — 77386 HC NTSTY MODUL RAD TX DLVR CPLX: CPT | Performed by: RADIOLOGY

## 2025-05-19 ENCOUNTER — HOSPITAL ENCOUNTER (OUTPATIENT)
Dept: RADIATION ONCOLOGY | Age: 70
Discharge: HOME | End: 2025-05-19
Payer: MEDICARE

## 2025-05-19 PROCEDURE — 77014 CHG CT GUIDANCE RADIATION THERAPY FLDS PLACEMENT: CPT | Performed by: RADIOLOGY

## 2025-05-19 PROCEDURE — 77386 HC NTSTY MODUL RAD TX DLVR CPLX: CPT | Performed by: RADIOLOGY

## 2025-05-20 ENCOUNTER — HOSPITAL ENCOUNTER (OUTPATIENT)
Dept: RADIATION ONCOLOGY | Age: 70
Discharge: HOME | End: 2025-05-20
Payer: MEDICARE

## 2025-05-20 PROCEDURE — 77386 HC NTSTY MODUL RAD TX DLVR CPLX: CPT | Performed by: RADIOLOGY

## 2025-05-20 NOTE — PLAN OF CARE
Care plan reviewed.    - Pt states he goes between occasional diarrhea and constipation. Take OTC anti-diarrheal which does help. Constipation only lasts x 1 day.   
quit cigarettes 5 yrs ago, 1/4- 1/2 ppd x 35 yrs. presently vapes daily

## 2025-05-20 NOTE — PROGRESS NOTES
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 Energy fairly good.  No hematuria or hematochezia. Bladder and bowels okay.       EXAM  Wt Readings from Last 3 Encounters:   05/14/25 76.2 kg (168 lb)   05/13/25 76.7 kg (169 lb)   05/06/25 79 kg (174 lb 3.2 oz)     NAD      Setup images, chart, plan reviewed    A/P:   Tolerating RT well  Continue RT as planned      Electronically signed by Tate Puga MD on 5/20/2025 at 1:18 PM

## 2025-05-21 ENCOUNTER — HOSPITAL ENCOUNTER (OUTPATIENT)
Dept: RADIATION ONCOLOGY | Age: 70
Discharge: HOME | End: 2025-05-21
Payer: MEDICARE

## 2025-05-21 PROCEDURE — 77386 HC NTSTY MODUL RAD TX DLVR CPLX: CPT | Performed by: RADIOLOGY

## 2025-05-22 ENCOUNTER — HOSPITAL ENCOUNTER (OUTPATIENT)
Dept: RADIATION ONCOLOGY | Age: 70
Discharge: HOME | End: 2025-05-22
Payer: MEDICARE

## 2025-05-22 PROCEDURE — 77336 RADIATION PHYSICS CONSULT: CPT | Performed by: RADIOLOGY

## 2025-05-22 PROCEDURE — 77386 HC NTSTY MODUL RAD TX DLVR CPLX: CPT | Performed by: RADIOLOGY

## 2025-05-23 ENCOUNTER — HOSPITAL ENCOUNTER (OUTPATIENT)
Dept: RADIATION ONCOLOGY | Age: 70
Discharge: HOME | End: 2025-05-23
Payer: MEDICARE

## 2025-05-23 PROCEDURE — 77386 HC NTSTY MODUL RAD TX DLVR CPLX: CPT | Performed by: RADIOLOGY

## 2025-05-27 ENCOUNTER — HOSPITAL ENCOUNTER (OUTPATIENT)
Dept: RADIATION ONCOLOGY | Age: 70
Discharge: HOME | End: 2025-05-27
Payer: MEDICARE

## 2025-05-27 ENCOUNTER — CLINICAL DOCUMENTATION (OUTPATIENT)
Dept: ONCOLOGY | Age: 70
End: 2025-05-27

## 2025-05-27 VITALS — WEIGHT: 163 LBS | BODY MASS INDEX: 25.53 KG/M2

## 2025-05-27 PROCEDURE — 77014 CHG CT GUIDANCE RADIATION THERAPY FLDS PLACEMENT: CPT | Performed by: RADIOLOGY

## 2025-05-27 PROCEDURE — 77386 HC NTSTY MODUL RAD TX DLVR CPLX: CPT | Performed by: RADIOLOGY

## 2025-05-27 ASSESSMENT — PAIN SCALES - GENERAL: PAINLEVEL_OUTOF10: 0

## 2025-05-27 NOTE — PROGRESS NOTES
while during/after cancer treatment.  Handouts provided: Diarrhea, Recipes for diarrhea, Maximizing Nutrition during/after Cancer treatment, Soft high protein foods list   Recommend to trial Fairlife/Core power high protein oral nutrition supplements or lactase enzyme pre prandial  RD contact information provided to patient if nutrition-related concerns arise. Available in person/over phone.  Specific tips and techniques to facilitate interventions provided and discussed with patient.     Will follow up with dietitian by next week  Last radiation treatment on: 6/3/25    Signed by Vanesa Larsen RDN, LD   Oncology Dietitian, [facility]

## 2025-05-27 NOTE — PLAN OF CARE
Care plan reviewed.    - Pt continues with diarrhea after eating. States around 5-7 episodes/day. Pt states he does take Imodium PRN but needs to get more.     - Also c/o fatigue. Able to perform ADLs without issue, just needs more rest.

## 2025-05-27 NOTE — PROGRESS NOTES
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 Energy fairly good.  Bladder okay. Looser bowel movements at times. No bleeding.       EXAM  Wt Readings from Last 3 Encounters:   05/27/25 73.9 kg (163 lb)   05/14/25 76.2 kg (168 lb)   05/13/25 76.7 kg (169 lb)     NAD      Setup images, chart, plan reviewed    A/P:   Tolerating RT well  Continue RT as planned      Electronically signed by Tate Puga MD on 5/27/2025 at 1:21 PM

## 2025-05-28 ENCOUNTER — HOSPITAL ENCOUNTER (OUTPATIENT)
Dept: RADIATION ONCOLOGY | Age: 70
Discharge: HOME | End: 2025-05-28
Payer: MEDICARE

## 2025-05-28 PROCEDURE — 77386 HC NTSTY MODUL RAD TX DLVR CPLX: CPT | Performed by: RADIOLOGY

## 2025-05-28 PROCEDURE — 77014 CHG CT GUIDANCE RADIATION THERAPY FLDS PLACEMENT: CPT | Performed by: RADIOLOGY

## 2025-05-29 ENCOUNTER — HOSPITAL ENCOUNTER (OUTPATIENT)
Dept: RADIATION ONCOLOGY | Age: 70
Discharge: HOME | End: 2025-05-29
Payer: MEDICARE

## 2025-05-29 PROCEDURE — 77014 CHG CT GUIDANCE RADIATION THERAPY FLDS PLACEMENT: CPT | Performed by: RADIOLOGY

## 2025-05-29 PROCEDURE — 77386 HC NTSTY MODUL RAD TX DLVR CPLX: CPT | Performed by: RADIOLOGY

## 2025-05-30 ENCOUNTER — HOSPITAL ENCOUNTER (OUTPATIENT)
Dept: RADIATION ONCOLOGY | Age: 70
Discharge: HOME | End: 2025-05-30
Payer: MEDICARE

## 2025-05-30 PROCEDURE — 77336 RADIATION PHYSICS CONSULT: CPT | Performed by: RADIOLOGY

## 2025-05-30 PROCEDURE — 77386 HC NTSTY MODUL RAD TX DLVR CPLX: CPT | Performed by: RADIOLOGY

## 2025-06-02 ENCOUNTER — HOSPITAL ENCOUNTER (OUTPATIENT)
Dept: RADIATION ONCOLOGY | Age: 70
Discharge: HOME | End: 2025-06-02
Payer: MEDICARE

## 2025-06-02 PROCEDURE — 77386 HC NTSTY MODUL RAD TX DLVR CPLX: CPT | Performed by: RADIOLOGY

## 2025-06-03 ENCOUNTER — HOSPITAL ENCOUNTER (OUTPATIENT)
Dept: RADIATION ONCOLOGY | Age: 70
Discharge: HOME | End: 2025-06-03
Payer: MEDICARE

## 2025-06-03 VITALS — BODY MASS INDEX: 25.28 KG/M2 | WEIGHT: 161.4 LBS

## 2025-06-03 PROCEDURE — 77386 HC NTSTY MODUL RAD TX DLVR CPLX: CPT | Performed by: RADIOLOGY

## 2025-06-03 ASSESSMENT — PAIN DESCRIPTION - PAIN TYPE: TYPE: ACUTE PAIN

## 2025-06-03 ASSESSMENT — PAIN DESCRIPTION - LOCATION: LOCATION: RECTUM

## 2025-06-03 ASSESSMENT — PAIN DESCRIPTION - DESCRIPTORS: DESCRIPTORS: BURNING;SORE

## 2025-06-03 ASSESSMENT — PAIN SCALES - GENERAL: PAINLEVEL_OUTOF10: 7

## 2025-06-03 ASSESSMENT — PAIN DESCRIPTION - FREQUENCY: FREQUENCY: INTERMITTENT

## 2025-06-03 ASSESSMENT — PAIN - FUNCTIONAL ASSESSMENT: PAIN_FUNCTIONAL_ASSESSMENT: PREVENTS OR INTERFERES SOME ACTIVE ACTIVITIES AND ADLS

## 2025-06-03 NOTE — PLAN OF CARE
Care plan reviewed.     _Pt still having several episodes of diarrhea after eating, taking Imodium PRN for loose stools.     _ Pt eating and drinking without difficulty. Encouraged to push fluids to hydrate, especially when having diarrhea.     _ Pt with soreness in anus and surrounding area, using ointment and taking ibuprofen PRN.     _ Pt with less energy this week, completing ADLs with periods of rest.

## 2025-06-03 NOTE — PROGRESS NOTES
Weekly Radiation Treatment Progress Note    DATE OF SERVICE: 6/3/2025     DIAGNOSIS:  likely adenocarcinoma of the rectum [id number]        TREATMENT COURSE:     xeloda      Site: pelvis   Current Total Radiation Dose: 4860 cGy  Fraction: 27/28    Pt doing well. Energy fairly good.  No bladder issues. Some discomfort with bms but not terrible. No hematuria or hematochezia.       EXAM  Wt Readings from Last 3 Encounters:   06/03/25 73.2 kg (161 lb 6.4 oz)   05/27/25 73.9 kg (163 lb)   05/14/25 76.2 kg (168 lb)     NAD      Setup images, chart, plan reviewed    A/P:   Tolerating RT well  Continue RT as planned      Electronically signed by Tate Puga MD on 6/3/2025 at 1:22 PM

## 2025-06-04 ENCOUNTER — HOSPITAL ENCOUNTER (OUTPATIENT)
Dept: RADIATION ONCOLOGY | Age: 70
Discharge: HOME | End: 2025-06-04
Payer: MEDICARE

## 2025-06-04 PROCEDURE — 77386 HC NTSTY MODUL RAD TX DLVR CPLX: CPT | Performed by: RADIOLOGY

## 2025-06-12 ENCOUNTER — TELEPHONE (OUTPATIENT)
Dept: ONCOLOGY | Age: 70
End: 2025-06-12

## 2025-06-12 NOTE — TELEPHONE ENCOUNTER
Called patient to reschedule their no show appointment on 6/12 with Guero Guadarrama.  Left message for patient to return call to reschedule appointment.  Sending out a NO SHOW letter.

## 2025-06-25 ENCOUNTER — CLINICAL DOCUMENTATION (OUTPATIENT)
Dept: ONCOLOGY | Age: 70
End: 2025-06-25

## 2025-06-25 ENCOUNTER — OFFICE VISIT (OUTPATIENT)
Dept: ONCOLOGY | Age: 70
End: 2025-06-25
Payer: MEDICARE

## 2025-06-25 ENCOUNTER — HOSPITAL ENCOUNTER (OUTPATIENT)
Dept: INFUSION THERAPY | Age: 70
Discharge: HOME | End: 2025-06-25
Attending: INTERNAL MEDICINE
Payer: MEDICARE

## 2025-06-25 VITALS
HEIGHT: 67 IN | BODY MASS INDEX: 24.55 KG/M2 | WEIGHT: 156.4 LBS | OXYGEN SATURATION: 98 % | TEMPERATURE: 97.7 F | SYSTOLIC BLOOD PRESSURE: 117 MMHG | HEART RATE: 88 BPM | DIASTOLIC BLOOD PRESSURE: 64 MMHG

## 2025-06-25 DIAGNOSIS — D49.0 RECTAL NEOPLASM: Primary | ICD-10-CM

## 2025-06-25 DIAGNOSIS — D64.9 ANEMIA, UNSPECIFIED TYPE: ICD-10-CM

## 2025-06-25 DIAGNOSIS — R91.8 LUNG NODULES: ICD-10-CM

## 2025-06-25 DIAGNOSIS — D49.0 RECTAL NEOPLASM: ICD-10-CM

## 2025-06-25 LAB
ALBUMIN SERPL-MCNC: 3.4 G/DL (ref 3.4–5)
ALBUMIN/GLOB SERPL: 0.9 {RATIO} (ref 1.1–2.2)
ALP SERPL-CCNC: 116 U/L (ref 40–129)
ALT SERPL-CCNC: 7 U/L (ref 10–40)
ANION GAP SERPL CALCULATED.3IONS-SCNC: 12 MMOL/L (ref 9–17)
AST SERPL-CCNC: 20 U/L (ref 15–37)
BASOPHILS # BLD: 0.01 K/UL
BASOPHILS NFR BLD: 0 % (ref 0–1)
BILIRUB SERPL-MCNC: 0.6 MG/DL (ref 0–1)
BUN SERPL-MCNC: 13 MG/DL (ref 7–20)
CALCIUM SERPL-MCNC: 9.1 MG/DL (ref 8.3–10.6)
CEA SERPL-MCNC: 3.4 NG/ML (ref 0–5)
CHLORIDE SERPL-SCNC: 99 MMOL/L (ref 99–110)
CO2 SERPL-SCNC: 24 MMOL/L (ref 21–32)
CREAT SERPL-MCNC: 0.6 MG/DL (ref 0.8–1.3)
EOSINOPHIL # BLD: 0.19 K/UL
EOSINOPHILS RELATIVE PERCENT: 3 % (ref 0–3)
ERYTHROCYTE [DISTWIDTH] IN BLOOD BY AUTOMATED COUNT: 22.7 % (ref 11.7–14.9)
GFR, ESTIMATED: >90 ML/MIN/1.73M2
GLUCOSE SERPL-MCNC: 101 MG/DL (ref 74–99)
HCT VFR BLD AUTO: 40.4 % (ref 42–52)
HGB BLD-MCNC: 12.9 G/DL (ref 13.5–18)
LYMPHOCYTES NFR BLD: 0.99 K/UL
LYMPHOCYTES RELATIVE PERCENT: 13 % (ref 24–44)
MCH RBC QN AUTO: 30.7 PG (ref 27–31)
MCHC RBC AUTO-ENTMCNC: 31.9 G/DL (ref 32–36)
MCV RBC AUTO: 96.2 FL (ref 78–100)
MONOCYTES NFR BLD: 0.89 K/UL
MONOCYTES NFR BLD: 12 % (ref 0–5)
NEUTROPHILS NFR BLD: 72 % (ref 36–66)
NEUTS SEG NFR BLD: 5.32 K/UL
PLATELET # BLD AUTO: 272 K/UL (ref 140–440)
PMV BLD AUTO: 9 FL (ref 7.5–11.1)
POTASSIUM SERPL-SCNC: 4.2 MMOL/L (ref 3.5–5.1)
PROT SERPL-MCNC: 7 G/DL (ref 6.4–8.2)
RBC # BLD AUTO: 4.2 M/UL (ref 4.6–6.2)
SODIUM SERPL-SCNC: 135 MMOL/L (ref 136–145)
WBC OTHER # BLD: 7.4 K/UL (ref 4–10.5)

## 2025-06-25 PROCEDURE — G8427 DOCREV CUR MEDS BY ELIG CLIN: HCPCS | Performed by: INTERNAL MEDICINE

## 2025-06-25 PROCEDURE — 99212 OFFICE O/P EST SF 10 MIN: CPT

## 2025-06-25 PROCEDURE — 3017F COLORECTAL CA SCREEN DOC REV: CPT | Performed by: INTERNAL MEDICINE

## 2025-06-25 PROCEDURE — 82378 CARCINOEMBRYONIC ANTIGEN: CPT

## 2025-06-25 PROCEDURE — 1124F ACP DISCUSS-NO DSCNMKR DOCD: CPT | Performed by: INTERNAL MEDICINE

## 2025-06-25 PROCEDURE — 1159F MED LIST DOCD IN RCRD: CPT | Performed by: INTERNAL MEDICINE

## 2025-06-25 PROCEDURE — 36415 COLL VENOUS BLD VENIPUNCTURE: CPT

## 2025-06-25 PROCEDURE — 80053 COMPREHEN METABOLIC PANEL: CPT

## 2025-06-25 PROCEDURE — 1036F TOBACCO NON-USER: CPT | Performed by: INTERNAL MEDICINE

## 2025-06-25 PROCEDURE — 1126F AMNT PAIN NOTED NONE PRSNT: CPT | Performed by: INTERNAL MEDICINE

## 2025-06-25 PROCEDURE — 99214 OFFICE O/P EST MOD 30 MIN: CPT | Performed by: INTERNAL MEDICINE

## 2025-06-25 PROCEDURE — G8420 CALC BMI NORM PARAMETERS: HCPCS | Performed by: INTERNAL MEDICINE

## 2025-06-25 PROCEDURE — 85025 COMPLETE CBC W/AUTO DIFF WBC: CPT

## 2025-06-25 RX ORDER — CAPECITABINE 500 MG/1
TABLET, FILM COATED ORAL
Qty: 84 TABLET | Refills: 5 | Status: ACTIVE | OUTPATIENT
Start: 2025-06-25

## 2025-06-25 RX ORDER — CAPECITABINE 150 MG/1
TABLET, FILM COATED ORAL
Qty: 56 TABLET | Refills: 5 | Status: ACTIVE | OUTPATIENT
Start: 2025-06-25

## 2025-06-25 NOTE — PROGRESS NOTES
Patient in clinic for OV today 06/25/2025. Patient completed RT 3 weeks ago. New regimen placed for CapeOX. NN referral placed. Physician recommending to give oral chemo at 1000 mg/m2 (1,800 mg) and requesting patient start ASAP. RX's for oral chemo e-scribed to [facility] as expedited request with below instructions per physician order:      Capecitabine (Xeloda) 150 mg - Take 2 tablets (300 mg) by mouth two times daily along with 1 other capecitabine prescription for total of 1,800 mg each dose two times daily for 14 days on then 7 days off every 21 days #56    Capecitabine (Xeloda) 500 mg - Take 3 tablets (1,500 mg) by mouth two times daily along with 1 other capecitabine prescription for total of 1,800 mg each dose two times daily for 14 days on then 7 days off every 21 days #84

## 2025-06-25 NOTE — PROGRESS NOTES
MA/LPN Rooming Questions  Patient: Tony Reis  MRN: [id number]    Date: 6/25/2025        1. Do you have any new issues?   yes - Patient states he often has diarreah and is now having occasional pain when having a bowel movement. Patient denies seeing any signs of blood in his still or constipation.        ..  Wt Readings from Last 3 Encounters:   06/25/25 70.9 kg (156 lb 6.4 oz)   06/03/25 73.2 kg (161 lb 6.4 oz)   05/27/25 73.9 kg (163 lb)        2. Do you need any refills on medications?    no    3. Have you had any imaging done since your last visit?   yes - labs 5/14    4. Have you been hospitalized or seen in the emergency room since your last visit here?   no    5. Did the patient have a depression screening completed today? No    No data recorded     PHQ-9 Given to (if applicable):               PHQ-9 Score (if applicable):                     [] Positive     []  Negative              Does question #9 need addressed (if applicable)                     [] Yes    []  No               Lizabeth Montero, [facility]      
06/28/2018    INR 0.99 06/28/2018    APTT 25.1 02/26/2015     Tumor Markers:  Lab Results   Component Value Date    CEA 4.3 03/21/2025      Observations:  No data recorded     Assessment & Plan:    1. He was referred for evaluation of clinical rectal cancer.  1/10/25 CMP and CBC grossly wnl. Hep C Ab non reactive.  3/5/25 colonoscopy: A fungating, infiltrative and ulcerated partially obstructing large mass was found at 1 cm proximal to the anus and in the rectum.   Pathology:  as above.  3/5/25 CT AP: as above  3/17/26 MRI pelvis: as above.  3/21/25 CEA 4.9.   3/28/25 Rectum, mass, biopsy: ADENOCARCINOMA.   Normal expression of MLH1, MSH2, MSH6, and PMS2.  These results show  no deficiency of the mismatch repair proteins tested (low probability of MSI-H).   Will request HER-2 traci study and NGS study.  4/9/25 CT chest: as above.  3/26/25 seen by Dr Donahue who recommended total NAC and RT.   4/23/25 start Xeloda 1600 mg BID with RT.  4/30/25 creat 0.6, LFTs stable for him. WBC 5, Hg 12.2, MCV 92.2, plat 331.   CARIS showed increased benefit to FOLFOX + avastin in first line CRC.   Kras negative, Nras negative, Braf V600E not detected.  5/14/25 CMP grossly stable for him. WBC 5.2, Hg 12.3, MCV 93.1, plat 285. Anemia related to chemo. On ferosul.   June 4, 2025 he completed chemo and RT.  To continue with Capeox for 12 -16 weeks.   To start xeloda 1000 mg/m2 for 14D on 21D cycles with oxaliplatin on D1 on 6/30/25.  6/25/25 CEA, CBC, CMP and iron study. Advise to call for result.  WBC 7.4, Hg 12.9. plat 272.  Denies hand foot mouth syndrome with last chemo.    2. C/o intermittent LLE extremity swelling since he had surgery with L ankle pain. I order VL study and recommend compression socks.  4/14/25 VL study negative for DVT.     3. RUSS  1/10/25 CMP and CBC grossly wnl. Hep C Ab non reactive.  3/21/25 CMP grossly stable for him. WBC 8.7, Hg 12.7, MCV 90.8, plat 439. Ferritin 15, Iron 26, TIBC 432, sat 6.   Recommend to

## 2025-06-26 ENCOUNTER — TELEPHONE (OUTPATIENT)
Dept: ONCOLOGY | Age: 70
End: 2025-06-26

## 2025-06-26 NOTE — TELEPHONE ENCOUNTER
Called patient regarding chemo ed and start date of tx, patient is scheduled for chemo ed and tx on 07/01 @ 0730 and labs on 06/30 @ 0800, advised them that 1 visitor allowed at time of education and day of their treatments, patient states understanding

## 2025-06-27 DIAGNOSIS — C20 CARCINOMA OF RECTUM (HCC): Primary | ICD-10-CM

## 2025-06-27 RX ORDER — SODIUM CHLORIDE 9 MG/ML
INJECTION, SOLUTION INTRAVENOUS PRN
OUTPATIENT
Start: 2025-07-22

## 2025-06-27 RX ORDER — MEPERIDINE HYDROCHLORIDE 50 MG/ML
12.5 INJECTION INTRAMUSCULAR; INTRAVENOUS; SUBCUTANEOUS PRN
OUTPATIENT
Start: 2025-07-01

## 2025-06-27 RX ORDER — DEXTROSE MONOHYDRATE 50 MG/ML
5-250 INJECTION, SOLUTION INTRAVENOUS PRN
Status: CANCELLED | OUTPATIENT
Start: 2025-07-01

## 2025-06-27 RX ORDER — ALBUTEROL SULFATE 90 UG/1
4 INHALANT RESPIRATORY (INHALATION) PRN
OUTPATIENT
Start: 2025-07-01

## 2025-06-27 RX ORDER — SODIUM CHLORIDE 0.9 % (FLUSH) 0.9 %
5-40 SYRINGE (ML) INJECTION PRN
Status: CANCELLED | OUTPATIENT
Start: 2025-07-01

## 2025-06-27 RX ORDER — ALBUTEROL SULFATE 90 UG/1
4 INHALANT RESPIRATORY (INHALATION) PRN
OUTPATIENT
Start: 2025-07-22

## 2025-06-27 RX ORDER — EPINEPHRINE 1 MG/ML
0.3 INJECTION, SOLUTION, CONCENTRATE INTRAVENOUS PRN
OUTPATIENT
Start: 2025-07-01

## 2025-06-27 RX ORDER — FAMOTIDINE 10 MG/ML
20 INJECTION, SOLUTION INTRAVENOUS
OUTPATIENT
Start: 2025-07-22

## 2025-06-27 RX ORDER — MEPERIDINE HYDROCHLORIDE 50 MG/ML
12.5 INJECTION INTRAMUSCULAR; INTRAVENOUS; SUBCUTANEOUS PRN
OUTPATIENT
Start: 2025-07-22

## 2025-06-27 RX ORDER — HEPARIN SODIUM (PORCINE) LOCK FLUSH IV SOLN 100 UNIT/ML 100 UNIT/ML
500 SOLUTION INTRAVENOUS PRN
OUTPATIENT
Start: 2025-07-22

## 2025-06-27 RX ORDER — SODIUM CHLORIDE 9 MG/ML
5-250 INJECTION, SOLUTION INTRAVENOUS PRN
OUTPATIENT
Start: 2025-07-22

## 2025-06-27 RX ORDER — SODIUM CHLORIDE 9 MG/ML
5-250 INJECTION, SOLUTION INTRAVENOUS PRN
OUTPATIENT
Start: 2025-07-01

## 2025-06-27 RX ORDER — ACETAMINOPHEN 325 MG/1
650 TABLET ORAL
OUTPATIENT
Start: 2025-07-22

## 2025-06-27 RX ORDER — PALONOSETRON 0.05 MG/ML
0.25 INJECTION, SOLUTION INTRAVENOUS ONCE
Status: CANCELLED | OUTPATIENT
Start: 2025-07-01 | End: 2025-06-30

## 2025-06-27 RX ORDER — SODIUM CHLORIDE 0.9 % (FLUSH) 0.9 %
5-40 SYRINGE (ML) INJECTION PRN
OUTPATIENT
Start: 2025-07-22

## 2025-06-27 RX ORDER — HYDROCORTISONE SODIUM SUCCINATE 100 MG/2ML
100 INJECTION INTRAMUSCULAR; INTRAVENOUS
OUTPATIENT
Start: 2025-07-01

## 2025-06-27 RX ORDER — HEPARIN SODIUM (PORCINE) LOCK FLUSH IV SOLN 100 UNIT/ML 100 UNIT/ML
500 SOLUTION INTRAVENOUS PRN
OUTPATIENT
Start: 2025-07-01

## 2025-06-27 RX ORDER — ONDANSETRON 2 MG/ML
8 INJECTION INTRAMUSCULAR; INTRAVENOUS
OUTPATIENT
Start: 2025-07-22

## 2025-06-27 RX ORDER — SODIUM CHLORIDE 9 MG/ML
INJECTION, SOLUTION INTRAVENOUS PRN
OUTPATIENT
Start: 2025-07-01

## 2025-06-27 RX ORDER — ACETAMINOPHEN 325 MG/1
650 TABLET ORAL
OUTPATIENT
Start: 2025-07-01

## 2025-06-27 RX ORDER — HYDROCORTISONE SODIUM SUCCINATE 100 MG/2ML
100 INJECTION INTRAMUSCULAR; INTRAVENOUS
OUTPATIENT
Start: 2025-07-22

## 2025-06-27 RX ORDER — PALONOSETRON 0.05 MG/ML
0.25 INJECTION, SOLUTION INTRAVENOUS ONCE
OUTPATIENT
Start: 2025-07-22 | End: 2025-07-21

## 2025-06-27 RX ORDER — DIPHENHYDRAMINE HYDROCHLORIDE 50 MG/ML
50 INJECTION, SOLUTION INTRAMUSCULAR; INTRAVENOUS
OUTPATIENT
Start: 2025-07-22

## 2025-06-27 RX ORDER — ONDANSETRON 2 MG/ML
8 INJECTION INTRAMUSCULAR; INTRAVENOUS
OUTPATIENT
Start: 2025-07-01

## 2025-06-27 RX ORDER — EPINEPHRINE 1 MG/ML
0.3 INJECTION, SOLUTION, CONCENTRATE INTRAVENOUS PRN
OUTPATIENT
Start: 2025-07-22

## 2025-06-27 RX ORDER — DEXTROSE MONOHYDRATE 50 MG/ML
5-250 INJECTION, SOLUTION INTRAVENOUS PRN
OUTPATIENT
Start: 2025-07-22

## 2025-06-27 RX ORDER — DIPHENHYDRAMINE HYDROCHLORIDE 50 MG/ML
50 INJECTION, SOLUTION INTRAMUSCULAR; INTRAVENOUS
OUTPATIENT
Start: 2025-07-01

## 2025-06-27 RX ORDER — FAMOTIDINE 10 MG/ML
20 INJECTION, SOLUTION INTRAVENOUS
OUTPATIENT
Start: 2025-07-01

## 2025-06-29 NOTE — PROGRESS NOTES
Patient Name:  Tony Reis  Patient :  1955  Patient MRN:  [id number]     Primary Oncologist: Guero Guadarrama MD  Referring Provider: Seun Cedeno MD     Date of Service: 7/15/2025      Chief Complaint:    Chief Complaint   Patient presents with    Follow-up     FU visit.     Patient Active Problem List:     Psoriasis     Irregular heart beat     GERD (gastroesophageal reflux disease)     History of total left hip arthroplasty     Pain and swelling of lower leg     History of total hip arthroplasty     Osteoarthritis     Status post total replacement of both hips     Dental caries     Open displaced oblique fracture of shaft of left tibia     Diarrhea     Foot swelling     Chronic diarrhea     Atherosclerosis of abdominal aorta     Carcinoma of rectum (HCC)     HPI:   Tomas Reis is a pleasant 68 yo male patient who was referred for evaluation of clinical rectal cancer.  He came in with significant other who stated that patient has had chronic diarrhea since 2024  1/10/25 CMP and CBC grossly wnl. Hep C Ab non reactive.  3/5/25 colonoscopy: The perianal exam was abnormal.  The digital rectal exam revealed a 1 cm (diameter) nodular, obstructing, hard and fixed rectal mass palpated 1.0 cm from the anal verge.  The mass was circumferential.  A malignant-appearing, intrinsic moderate stenosis measuring 8 cm (in length) x 12 mm (inner diameter) was found in the rectum and  was traversed.  A fungating, infiltrative and ulcerated partially obstructing large mass was found at 1 cm proximal to the anus and in the rectum. The mass was circumferential. The mass measured 8 cm (in length). Oozing was present. Biopsies were taken with a cold forceps for histology.  A few medium-mouthed diverticula were found in the sigmoid colon and descending colon.  A 4 mm polyp was found in the cecum. The polyp was sessile. The polyp was removed with a cold snare. Resection and retrieval were complete.  Impression:  Abnormal

## 2025-07-01 ENCOUNTER — HOSPITAL ENCOUNTER (OUTPATIENT)
Dept: INFUSION THERAPY | Age: 70
Discharge: HOME | End: 2025-07-01
Attending: INTERNAL MEDICINE
Payer: MEDICARE

## 2025-07-01 ENCOUNTER — CLINICAL SUPPORT (OUTPATIENT)
Dept: ONCOLOGY | Age: 70
End: 2025-07-01

## 2025-07-01 VITALS
TEMPERATURE: 97.6 F | BODY MASS INDEX: 24.64 KG/M2 | SYSTOLIC BLOOD PRESSURE: 104 MMHG | DIASTOLIC BLOOD PRESSURE: 68 MMHG | WEIGHT: 157 LBS | OXYGEN SATURATION: 98 % | HEART RATE: 76 BPM | HEIGHT: 67 IN

## 2025-07-01 DIAGNOSIS — C20 CARCINOMA OF RECTUM (HCC): Primary | ICD-10-CM

## 2025-07-01 PROCEDURE — 6360000002 HC RX W HCPCS: Performed by: INTERNAL MEDICINE

## 2025-07-01 PROCEDURE — 96367 TX/PROPH/DG ADDL SEQ IV INF: CPT

## 2025-07-01 PROCEDURE — 96415 CHEMO IV INFUSION ADDL HR: CPT

## 2025-07-01 PROCEDURE — 2580000003 HC RX 258: Performed by: INTERNAL MEDICINE

## 2025-07-01 PROCEDURE — 6370000000 HC RX 637 (ALT 250 FOR IP): Performed by: INTERNAL MEDICINE

## 2025-07-01 PROCEDURE — 96375 TX/PRO/DX INJ NEW DRUG ADDON: CPT

## 2025-07-01 PROCEDURE — 2500000003 HC RX 250 WO HCPCS: Performed by: INTERNAL MEDICINE

## 2025-07-01 PROCEDURE — 96413 CHEMO IV INFUSION 1 HR: CPT

## 2025-07-01 RX ORDER — DEXTROSE MONOHYDRATE 50 MG/ML
5-250 INJECTION, SOLUTION INTRAVENOUS PRN
Status: DISCONTINUED | OUTPATIENT
Start: 2025-07-01 | End: 2025-07-02 | Stop reason: HOSPADM

## 2025-07-01 RX ORDER — DEXAMETHASONE 4 MG/1
TABLET ORAL
Qty: 16 TABLET | Refills: 2 | Status: SHIPPED | OUTPATIENT
Start: 2025-07-01

## 2025-07-01 RX ORDER — SODIUM CHLORIDE 0.9 % (FLUSH) 0.9 %
5-40 SYRINGE (ML) INJECTION PRN
Status: DISCONTINUED | OUTPATIENT
Start: 2025-07-01 | End: 2025-07-02 | Stop reason: HOSPADM

## 2025-07-01 RX ORDER — ONDANSETRON 8 MG/1
8 TABLET, FILM COATED ORAL EVERY 8 HOURS PRN
Qty: 90 TABLET | Refills: 3 | Status: SHIPPED | OUTPATIENT
Start: 2025-07-01

## 2025-07-01 RX ORDER — PROMETHAZINE HYDROCHLORIDE 12.5 MG/1
12.5 TABLET ORAL ONCE
Status: COMPLETED | OUTPATIENT
Start: 2025-07-01 | End: 2025-07-01

## 2025-07-01 RX ORDER — PALONOSETRON 0.05 MG/ML
0.25 INJECTION, SOLUTION INTRAVENOUS ONCE
Status: COMPLETED | OUTPATIENT
Start: 2025-07-01 | End: 2025-07-01

## 2025-07-01 RX ORDER — PROCHLORPERAZINE EDISYLATE 5 MG/ML
5 INJECTION INTRAMUSCULAR; INTRAVENOUS ONCE
Status: DISCONTINUED | OUTPATIENT
Start: 2025-07-01 | End: 2025-07-01

## 2025-07-01 RX ADMIN — DEXTROSE 20 ML/HR: 5 SOLUTION INTRAVENOUS at 08:29

## 2025-07-01 RX ADMIN — SODIUM CHLORIDE, PRESERVATIVE FREE 20 ML: 5 INJECTION INTRAVENOUS at 08:49

## 2025-07-01 RX ADMIN — OXALIPLATIN 235 MG: 5 INJECTION, SOLUTION INTRAVENOUS at 08:52

## 2025-07-01 RX ADMIN — PROMETHAZINE HYDROCHLORIDE 12.5 MG: 12.5 TABLET ORAL at 11:24

## 2025-07-01 RX ADMIN — PALONOSETRON 0.25 MG: 0.25 INJECTION, SOLUTION INTRAVENOUS at 08:30

## 2025-07-01 RX ADMIN — DEXAMETHASONE SODIUM PHOSPHATE 12 MG: 4 INJECTION, SOLUTION INTRAMUSCULAR; INTRAVENOUS at 08:30

## 2025-07-01 NOTE — PROGRESS NOTES
Patient arrived with friend Lara for treatment planning and chemotherapy education.  Discussed treatment plan, potential side effects, prevention, and symptom management.  Reviewed chemotherapy education folder and drug monograph.  Patient's regimen will be CapeOx- Capecitabine 1800 mg- twice daily for 14 days on then 7 days off every 21 days with Oxaliplatin every 21 days.    Patient signed chemotherapy consent for Capeox. Copy of consent given to patient.    Reviewed chemotherapy schedule/calendar.     Per Dr. Jack - scripts for Zofran and Dexamethasone sent to pharmacy.  Patient given calendar and written instructions for these medications and how/when to take.      Patient's premeds as follows:  Dexamethasone 4 mg one tab in am with food x2 days starting day after each Oxaliplatin infusion  Zofran 8 mg one tab every 8 hours PRN.    Distress thermometer given to patient to fill out - this RN reviewed with patient. Patient deferred any referrals at this time.  Copy given to Psychosocial Coordinator.    Patient given on-call phone number for after office hours and weekends.    Patient receiving first dose of Oxaliplatin today and will begin his Capecitabine this evening.

## 2025-07-01 NOTE — PROGRESS NOTES
Patient to Infusion suite for D1C1 of treatment. PIV inserted , with brisk blood return.   Pt without any new issues to inform physician of, labs and vitals within treatment range. Education provided by REMA Feliz and consent signed. This nurse educated pt on need to make staff aware if he is feeling any differently during treatment, pt also made aware to let staff know if IV feels any different or any swelling or redness.  Orders released and given. At end of treatment pt stated that that his IV site is \"Tingly\", this nurse assessed site and reddness above site without swelling or drainage. Pt also stated that he was nauseated. Pharmacist made aware of reaction from IV as well as Dr. Jack. Dr. Jack assessed site stated for pt to watch site and call if site changes. This nurse observed pt for 20 min post infusion, heat applied to site (per Fatou, pharmacist), pt stated that the tingling had mostly dissipated. Site remained red, not much change before d'c. This nurse sent pt with hot pack, told pt to call office if any changes to area. Pt stated understanding, as well as wife. Pt left ambulatory, with paperwork.     [employer] submitted and pt called at end of shift, stated that redness gone with a little \"tingling\" at site left    7/15/2025  8:30 AM LAB DRAW [facility] MEDICAL ONCOLOGY SCHEDULE, [facility] MED ONC LAB    Appointment Notes:   LABS FOR TOX CHECK + OV @ 0845           7/15/2025  8:45 AM FOLLOW UP [facility] [place] Guero Guadarrama MD   Appointment Notes:   4 WK F/U

## 2025-07-09 ENCOUNTER — TELEPHONE (OUTPATIENT)
Dept: ONCOLOGY | Age: 70
End: 2025-07-09

## 2025-07-09 NOTE — TELEPHONE ENCOUNTER
7/9/25 - spoke to pt and scheduled the 7/17/25 CT chest at [facility] arrive at 9:45 am and NPO 4.

## 2025-07-14 ENCOUNTER — CLINICAL DOCUMENTATION (OUTPATIENT)
Dept: ONCOLOGY | Age: 70
End: 2025-07-14

## 2025-07-14 NOTE — PROGRESS NOTES
Oncology Dietitian Nutrition Assessment   [facility]  1:30 PM  7/14/25    Type of visit: Follow Up  RD Recommendations/Nutrition Goals:   Continue Diet: Low fiber diet with soluble fiber   Continue anti-diarrheal regimen/imodium up to 8x/day per MD/nsg   Continue lower high fat foods and lactose-rich foods. Consider lactase enzymes prn   Encourage adequate hydration with electrolyte intake, aim 2.4L per day fluids   Monitor GI tolerance, lytes, diet recall, fluids, POC      Reason for visit:    Carcinoma of Rectum   Improve nutritional status during and after radiation treatment   Weight Maintenance throughout treatment   Improve treatment tolerance   Minimize short-term and long-term treatment side effects     Nutrition Assessment:   PG-SGA (SF) total score: 5, low malnutrition risk    Malnutrition Status NFPE performed (5/6/25)  Upon start of treatment/during: No  Level: At risk  Meets 2 of 6 ASPEN criteria: No  Energy intake: No - Noted diarrhea possible increased nutrient needs  Unintentional weight loss: No   3. Muscle loss: No- areas include temples, clavicles, hands, quadriceps, calves, scapula   4. Fat loss: No - areas include orbital, buccal, ribs, triceps  5. Hand  strength: No  6. Edema: Yes in BLE    Diet Recall/History and Assessment:  Met with pt via phone post RT. Pt is a 69 year old male with rectal cancer, undergoing Concurrent treatment. Pt c/o diarrhea (chronic issue over 1 yr). Reviewed diet call. Good appetite after RT completion. His spouse goes grocery shopping. Pt has been trying low to free lactose dairy. Pt has been drinking at least 64 fl oz per day. Pt reports eating fish with veggies and fruits lately. Pt doesn't eat big at one time, will eat throughout the day. Pt has not been taking stool softener which made it difficulty for diarrhea. RD encourage pt to eat adequate protein and proper hydration post tx. Pt reports weighing 151 lb at end of tx.

## 2025-07-15 ENCOUNTER — OFFICE VISIT (OUTPATIENT)
Dept: ONCOLOGY | Age: 70
End: 2025-07-15
Payer: MEDICARE

## 2025-07-15 ENCOUNTER — HOSPITAL ENCOUNTER (OUTPATIENT)
Dept: INFUSION THERAPY | Age: 70
Discharge: HOME | End: 2025-07-15
Attending: INTERNAL MEDICINE
Payer: MEDICARE

## 2025-07-15 VITALS
SYSTOLIC BLOOD PRESSURE: 123 MMHG | HEIGHT: 67 IN | TEMPERATURE: 97.7 F | BODY MASS INDEX: 24.52 KG/M2 | WEIGHT: 156.2 LBS | HEART RATE: 93 BPM | OXYGEN SATURATION: 98 % | DIASTOLIC BLOOD PRESSURE: 75 MMHG

## 2025-07-15 DIAGNOSIS — D64.9 ANEMIA, UNSPECIFIED TYPE: ICD-10-CM

## 2025-07-15 DIAGNOSIS — C20 CARCINOMA OF RECTUM (HCC): ICD-10-CM

## 2025-07-15 DIAGNOSIS — D49.0 RECTAL NEOPLASM: Primary | ICD-10-CM

## 2025-07-15 LAB
ALBUMIN SERPL-MCNC: 3.2 G/DL (ref 3.4–5)
ALBUMIN/GLOB SERPL: 0.8 {RATIO} (ref 1.1–2.2)
ALP SERPL-CCNC: 111 U/L (ref 40–129)
ALT SERPL-CCNC: 8 U/L (ref 10–40)
ANION GAP SERPL CALCULATED.3IONS-SCNC: 10 MMOL/L (ref 9–17)
AST SERPL-CCNC: 20 U/L (ref 15–37)
BASOPHILS # BLD: 0.01 K/UL
BASOPHILS NFR BLD: 0 % (ref 0–1)
BILIRUB SERPL-MCNC: 0.7 MG/DL (ref 0–1)
BUN SERPL-MCNC: 14 MG/DL (ref 7–20)
CALCIUM SERPL-MCNC: 8.9 MG/DL (ref 8.3–10.6)
CHLORIDE SERPL-SCNC: 101 MMOL/L (ref 99–110)
CO2 SERPL-SCNC: 24 MMOL/L (ref 21–32)
CREAT SERPL-MCNC: 0.6 MG/DL (ref 0.8–1.3)
EOSINOPHIL # BLD: 0.2 K/UL
EOSINOPHILS RELATIVE PERCENT: 4 % (ref 0–3)
ERYTHROCYTE [DISTWIDTH] IN BLOOD BY AUTOMATED COUNT: 21.7 % (ref 11.7–14.9)
FERRITIN SERPL-MCNC: 93 NG/ML (ref 30–400)
GFR, ESTIMATED: >90 ML/MIN/1.73M2
GLUCOSE SERPL-MCNC: 110 MG/DL (ref 74–99)
HCT VFR BLD AUTO: 40.4 % (ref 42–52)
HGB BLD-MCNC: 12.9 G/DL (ref 13.5–18)
IRON SATN MFR SERPL: 23 % (ref 15–50)
IRON SERPL-MCNC: 81 UG/DL (ref 59–158)
LYMPHOCYTES NFR BLD: 0.95 K/UL
LYMPHOCYTES RELATIVE PERCENT: 17 % (ref 24–44)
MCH RBC QN AUTO: 31.7 PG (ref 27–31)
MCHC RBC AUTO-ENTMCNC: 31.9 G/DL (ref 32–36)
MCV RBC AUTO: 99.3 FL (ref 78–100)
MONOCYTES NFR BLD: 0.78 K/UL
MONOCYTES NFR BLD: 14 % (ref 0–5)
NEUTROPHILS NFR BLD: 65 % (ref 36–66)
NEUTS SEG NFR BLD: 3.62 K/UL
PLATELET # BLD AUTO: 189 K/UL (ref 140–440)
PMV BLD AUTO: 9.7 FL (ref 7.5–11.1)
POTASSIUM SERPL-SCNC: 4.2 MMOL/L (ref 3.5–5.1)
PROT SERPL-MCNC: 6.9 G/DL (ref 6.4–8.2)
RBC # BLD AUTO: 4.07 M/UL (ref 4.6–6.2)
SODIUM SERPL-SCNC: 134 MMOL/L (ref 136–145)
TIBC SERPL-MCNC: 344 UG/DL (ref 260–445)
UNSATURATED IRON BINDING CAPACITY: 263 UG/DL (ref 110–370)
WBC OTHER # BLD: 5.6 K/UL (ref 4–10.5)

## 2025-07-15 PROCEDURE — G8427 DOCREV CUR MEDS BY ELIG CLIN: HCPCS | Performed by: INTERNAL MEDICINE

## 2025-07-15 PROCEDURE — 1159F MED LIST DOCD IN RCRD: CPT | Performed by: INTERNAL MEDICINE

## 2025-07-15 PROCEDURE — 1036F TOBACCO NON-USER: CPT | Performed by: INTERNAL MEDICINE

## 2025-07-15 PROCEDURE — 1126F AMNT PAIN NOTED NONE PRSNT: CPT | Performed by: INTERNAL MEDICINE

## 2025-07-15 PROCEDURE — 82728 ASSAY OF FERRITIN: CPT

## 2025-07-15 PROCEDURE — 99213 OFFICE O/P EST LOW 20 MIN: CPT | Performed by: INTERNAL MEDICINE

## 2025-07-15 PROCEDURE — 3017F COLORECTAL CA SCREEN DOC REV: CPT | Performed by: INTERNAL MEDICINE

## 2025-07-15 PROCEDURE — 83540 ASSAY OF IRON: CPT

## 2025-07-15 PROCEDURE — G8420 CALC BMI NORM PARAMETERS: HCPCS | Performed by: INTERNAL MEDICINE

## 2025-07-15 PROCEDURE — 85025 COMPLETE CBC W/AUTO DIFF WBC: CPT

## 2025-07-15 PROCEDURE — 1124F ACP DISCUSS-NO DSCNMKR DOCD: CPT | Performed by: INTERNAL MEDICINE

## 2025-07-15 PROCEDURE — 36415 COLL VENOUS BLD VENIPUNCTURE: CPT

## 2025-07-15 PROCEDURE — 99212 OFFICE O/P EST SF 10 MIN: CPT

## 2025-07-15 PROCEDURE — 83550 IRON BINDING TEST: CPT

## 2025-07-15 PROCEDURE — 80053 COMPREHEN METABOLIC PANEL: CPT

## 2025-07-15 NOTE — PROGRESS NOTES
MA/LPN Rooming Questions  Patient: Tony Reis  MRN: [id number]    Date: 7/15/2025        1. Do you have any new issues?   no         2. Do you need any refills on medications?    no    3. Have you had any imaging done since your last visit?   no    4. Have you been hospitalized or seen in the emergency room since your last visit here?   no    5. Did the patient have a depression screening completed today? No    No data recorded     PHQ-9 Given to (if applicable):               PHQ-9 Score (if applicable):                     [] Positive     []  Negative              Does question #9 need addressed (if applicable)                     [] Yes    []  No               Taisha Gordillo MA

## 2025-07-16 ENCOUNTER — CLINICAL DOCUMENTATION (OUTPATIENT)
Dept: ONCOLOGY | Age: 70
End: 2025-07-16

## 2025-07-17 ENCOUNTER — HOSPITAL ENCOUNTER (OUTPATIENT)
Dept: CT IMAGING | Age: 70
Discharge: HOME | End: 2025-07-17
Attending: INTERNAL MEDICINE
Payer: MEDICARE

## 2025-07-17 DIAGNOSIS — D49.0 RECTAL NEOPLASM: ICD-10-CM

## 2025-07-17 DIAGNOSIS — R91.8 LUNG NODULES: ICD-10-CM

## 2025-07-17 PROCEDURE — 6360000004 HC RX CONTRAST MEDICATION: Performed by: INTERNAL MEDICINE

## 2025-07-17 PROCEDURE — 71260 CT THORAX DX C+: CPT

## 2025-07-17 PROCEDURE — 2500000003 HC RX 250 WO HCPCS: Performed by: INTERNAL MEDICINE

## 2025-07-17 RX ORDER — SODIUM CHLORIDE 9 MG/ML
10 INJECTION, SOLUTION INTRAMUSCULAR; INTRAVENOUS; SUBCUTANEOUS PRN
Status: DISCONTINUED | OUTPATIENT
Start: 2025-07-17 | End: 2025-07-18 | Stop reason: HOSPADM

## 2025-07-17 RX ORDER — IOPAMIDOL 755 MG/ML
75 INJECTION, SOLUTION INTRAVASCULAR
Status: COMPLETED | OUTPATIENT
Start: 2025-07-17 | End: 2025-07-17

## 2025-07-17 RX ADMIN — SODIUM CHLORIDE, PRESERVATIVE FREE 10 ML: 5 INJECTION INTRAVENOUS at 09:45

## 2025-07-17 RX ADMIN — IOPAMIDOL 75 ML: 755 INJECTION, SOLUTION INTRAVENOUS at 09:48

## 2025-07-21 ENCOUNTER — HOSPITAL ENCOUNTER (OUTPATIENT)
Dept: INFUSION THERAPY | Age: 70
Discharge: HOME | End: 2025-07-21
Attending: INTERNAL MEDICINE
Payer: MEDICARE

## 2025-07-21 DIAGNOSIS — D49.0 RECTAL NEOPLASM: ICD-10-CM

## 2025-07-21 LAB
ALBUMIN SERPL-MCNC: 3.6 G/DL (ref 3.4–5)
ALBUMIN/GLOB SERPL: 0.9 {RATIO} (ref 1.1–2.2)
ALP SERPL-CCNC: 117 U/L (ref 40–129)
ALT SERPL-CCNC: 8 U/L (ref 10–40)
ANION GAP SERPL CALCULATED.3IONS-SCNC: 12 MMOL/L (ref 9–17)
AST SERPL-CCNC: 21 U/L (ref 15–37)
BASOPHILS # BLD: 0.01 K/UL
BASOPHILS NFR BLD: 0 % (ref 0–1)
BILIRUB SERPL-MCNC: 0.4 MG/DL (ref 0–1)
BUN SERPL-MCNC: 15 MG/DL (ref 7–20)
CALCIUM SERPL-MCNC: 8.8 MG/DL (ref 8.3–10.6)
CHLORIDE SERPL-SCNC: 99 MMOL/L (ref 99–110)
CO2 SERPL-SCNC: 23 MMOL/L (ref 21–32)
CREAT SERPL-MCNC: 0.7 MG/DL (ref 0.8–1.3)
EOSINOPHIL # BLD: 0.34 K/UL
EOSINOPHILS RELATIVE PERCENT: 6 % (ref 0–3)
ERYTHROCYTE [DISTWIDTH] IN BLOOD BY AUTOMATED COUNT: 22.5 % (ref 11.7–14.9)
GFR, ESTIMATED: >90 ML/MIN/1.73M2
GLUCOSE SERPL-MCNC: 109 MG/DL (ref 74–99)
HCT VFR BLD AUTO: 39.9 % (ref 42–52)
HGB BLD-MCNC: 12.9 G/DL (ref 13.5–18)
LYMPHOCYTES NFR BLD: 1.14 K/UL
LYMPHOCYTES RELATIVE PERCENT: 18 % (ref 24–44)
MCH RBC QN AUTO: 32.3 PG (ref 27–31)
MCHC RBC AUTO-ENTMCNC: 32.3 G/DL (ref 32–36)
MCV RBC AUTO: 100 FL (ref 78–100)
MONOCYTES NFR BLD: 0.8 K/UL
MONOCYTES NFR BLD: 13 % (ref 0–5)
NEUTROPHILS NFR BLD: 63 % (ref 36–66)
NEUTS SEG NFR BLD: 3.9 K/UL
PLATELET # BLD AUTO: 208 K/UL (ref 140–440)
PMV BLD AUTO: 8.8 FL (ref 7.5–11.1)
POTASSIUM SERPL-SCNC: 4 MMOL/L (ref 3.5–5.1)
PROT SERPL-MCNC: 7.5 G/DL (ref 6.4–8.2)
RBC # BLD AUTO: 3.99 M/UL (ref 4.6–6.2)
SODIUM SERPL-SCNC: 134 MMOL/L (ref 136–145)
WBC OTHER # BLD: 6.2 K/UL (ref 4–10.5)

## 2025-07-21 PROCEDURE — 36415 COLL VENOUS BLD VENIPUNCTURE: CPT

## 2025-07-21 PROCEDURE — 85025 COMPLETE CBC W/AUTO DIFF WBC: CPT

## 2025-07-21 PROCEDURE — 80053 COMPREHEN METABOLIC PANEL: CPT

## 2025-07-22 ENCOUNTER — HOSPITAL ENCOUNTER (OUTPATIENT)
Dept: RADIATION ONCOLOGY | Age: 70
Discharge: HOME | End: 2025-07-22

## 2025-07-22 ENCOUNTER — HOSPITAL ENCOUNTER (OUTPATIENT)
Dept: INFUSION THERAPY | Age: 70
Discharge: HOME | End: 2025-07-22
Attending: INTERNAL MEDICINE
Payer: MEDICARE

## 2025-07-22 VITALS
TEMPERATURE: 98.9 F | WEIGHT: 155 LBS | BODY MASS INDEX: 23.49 KG/M2 | DIASTOLIC BLOOD PRESSURE: 68 MMHG | HEART RATE: 47 BPM | SYSTOLIC BLOOD PRESSURE: 119 MMHG | OXYGEN SATURATION: 99 % | HEIGHT: 68 IN

## 2025-07-22 DIAGNOSIS — D49.0 RECTAL NEOPLASM: Primary | ICD-10-CM

## 2025-07-22 PROCEDURE — 96365 THER/PROPH/DIAG IV INF INIT: CPT

## 2025-07-22 PROCEDURE — 2500000003 HC RX 250 WO HCPCS: Performed by: INTERNAL MEDICINE

## 2025-07-22 PROCEDURE — 2580000003 HC RX 258: Performed by: INTERNAL MEDICINE

## 2025-07-22 PROCEDURE — 96413 CHEMO IV INFUSION 1 HR: CPT

## 2025-07-22 PROCEDURE — 96415 CHEMO IV INFUSION ADDL HR: CPT

## 2025-07-22 PROCEDURE — 96375 TX/PRO/DX INJ NEW DRUG ADDON: CPT

## 2025-07-22 PROCEDURE — 6360000002 HC RX W HCPCS: Performed by: INTERNAL MEDICINE

## 2025-07-22 RX ORDER — SODIUM CHLORIDE 0.9 % (FLUSH) 0.9 %
5-40 SYRINGE (ML) INJECTION PRN
Status: DISCONTINUED | OUTPATIENT
Start: 2025-07-22 | End: 2025-07-23 | Stop reason: HOSPADM

## 2025-07-22 RX ORDER — PALONOSETRON 0.05 MG/ML
0.25 INJECTION, SOLUTION INTRAVENOUS ONCE
Status: COMPLETED | OUTPATIENT
Start: 2025-07-22 | End: 2025-07-22

## 2025-07-22 RX ORDER — DEXTROSE MONOHYDRATE 50 MG/ML
5-250 INJECTION, SOLUTION INTRAVENOUS PRN
Status: DISCONTINUED | OUTPATIENT
Start: 2025-07-22 | End: 2025-07-23 | Stop reason: HOSPADM

## 2025-07-22 RX ADMIN — DEXAMETHASONE SODIUM PHOSPHATE 12 MG: 4 INJECTION, SOLUTION INTRAMUSCULAR; INTRAVENOUS at 11:38

## 2025-07-22 RX ADMIN — SODIUM CHLORIDE, PRESERVATIVE FREE 30 ML: 5 INJECTION INTRAVENOUS at 11:35

## 2025-07-22 RX ADMIN — DEXTROSE 25 ML/HR: 5 SOLUTION INTRAVENOUS at 11:38

## 2025-07-22 RX ADMIN — PALONOSETRON 0.25 MG: 0.25 INJECTION, SOLUTION INTRAVENOUS at 11:35

## 2025-07-22 RX ADMIN — OXALIPLATIN 235 MG: 5 INJECTION, SOLUTION INTRAVENOUS at 12:18

## 2025-07-22 ASSESSMENT — PAIN SCALES - GENERAL: PAINLEVEL_OUTOF10: 0

## 2025-07-22 NOTE — PROGRESS NOTES
Status appropriately assessed and documented. All required labs and results reviewed. Treatment approved by provider. Treatment orders and medications verified by 2 Registered Nurses where applicable. Treatment plan was confirmed with patient prior to administration, and educated the need to report any treatment-related symptoms      Ambulated to infusion area, here today for treatment. Had an OV with , No concerns at this time. Left hand PIV, positive blood return noted. Labs reviewed, Labs within defined limits.  Treatment administered as ordered. Call light within reach. Tolerated infusion without incident.  Discharge instructions declined.    Pt's left hand was tingling, cold and calmy at the end of the treatment.  NO signs of infiltration. Gave pt warm pack to help area. Dali and pt stated that this happened previous treatment.      8/5/2025  8:45 AM FOLLOW UP [facility] Guero Guadarrama MD    Appointment Notes:             8/12/2025 10:30 AM 4 HOUR CHEMO [facility] MEDICAL ONCOLOGY SCHEDULE, [facility] MED ONC TREATMENT   Appointment Notes:   OXALIPLATIN

## 2025-07-22 NOTE — PROGRESS NOTES
Tony Reis  7/22/2025    Patient is seen today for follow up.     Vitals:    07/22/25 1051   BP: 119/68   Pulse: (!) 47   Temp: 98.9 °F (37.2 °C)   SpO2: 99%        Oxygen Therapy  SpO2: 99 %  O2 Device: None (Room air)    Wt Readings from Last 3 Encounters:   07/22/25 70.3 kg (155 lb)   07/15/25 70.9 kg (156 lb 3.2 oz)   07/01/25 71.2 kg (157 lb)       Pain Assessment  Pain Assessment: None - Denies Pain  Pain Level: 0  Patient's Stated Pain Goal: 0 - No pain  Denies Need for Intervention       Allergies   Allergen Reactions    Codeine Nausea And Vomiting    Vicodin [Hydrocodone-Acetaminophen] Nausea And Vomiting and Other (See Comments)     Other reaction(s): Other - comment required  sweating  sweats        Current Outpatient Medications   Medication Sig Dispense Refill    ondansetron (ZOFRAN) 8 MG tablet Take 1 tablet by mouth every 8 hours as needed for Nausea or Vomiting 90 tablet 3    dexAMETHasone (DECADRON) 4 MG tablet Take one tab daily in the morning with food for 2 days after each IV infusion 16 tablet 2    capecitabine (XELODA) 150 MG chemo tablet Take 2 tablets (300 mg) by mouth two times daily along with 1 other capecitabine prescription for total of 1,800 mg each dose two times daily for 14 days on then 7 days off every 21 days 56 tablet 5    capecitabine (XELODA) 500 MG chemo tablet Take 3 tablets (1,500 mg) by mouth two times daily along with 1 other capecitabine prescription for total of 1,800 mg each dose two times daily for 14 days on then 7 days off every 21 days 84 tablet 5    Magic Mouthwash (MIRACLE MOUTHWASH) Equal parts of Benadryl, Maalox, 2% Viscous Xylocaine and Dexamethasone.  Take 5 mL 4 times daily. 240 mL 2    loperamide (IMODIUM) 2 MG capsule Take 1 capsule by mouth as needed for Diarrhea Up to 8 tabs/day      ondansetron (ZOFRAN) 8 MG tablet Take 1 tablet by mouth every 8 hours as needed for Nausea or Vomiting 90 tablet 3    polyethylene glycol (GLYCOLAX) 17 GM/SCOOP powder Take

## 2025-07-22 NOTE — PROGRESS NOTES
[facility]   Radiation Oncology Center  [address] [place] [address]  Phone: [phone]  Fax: [phone]    RADIATION ONCOLOGY FOLLOW UP REPORT    PATIENT NAME:  Tony Reis              : 1955  MEDICAL RECORD NO: [id number]    [facility] NO: [id number]        PROVIDER: Tate Puga MD    DATE OF SERVICE: 2025     Other Physicians:  Dr. 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 male who has a history as above who returns today for routine follow-up visit.  He completed radiation 25.     CT chest 2025 showed no evidence of metastatic disease or acute process.  Small left lower lobe nodule that was seen on the scan in April looks to be about the same size.  No new ones either.    Getting chemotherapy today.  Seeing medical oncology in a few weeks.     No bladder issues. No hematuria or hematochezia. No blood in BMs. Having more solid bowel movements.     RADIOLOGIC STUDIES:  CT CHEST W CONTRAST  Result Date: 2025  PROCEDURE: CT CHEST W CONTRAST DATE OF EXAM:  2025 10:06 DEMOGRAPHICS: 69 years old Male INDICATION: Neoplasm of unspecified behavior of digestive system, Other nonspecific abnormal finding of lung field TECHNIQUE: CT CHEST W CONTRAST COMPARISON: 2025. FINDINGS:  Thyroid is within normal limits. No mediastinal or hilar adenopathy. Pulmonary vasculature is intact. Coronary artery atherosclerosis. Mildly patulous esophagus. Mild hepatic steatosis. Adrenal glands and kidneys are within normal limits. Osseous structures are intact. Mild degenerative changes of the spine. Evaluation of the lungs demonstrates no suspicious pulmonary nodules. IMPRESSION:  1.  No acute finding in the chest. No suspicious pulmonary nodules. 2.  Coronary artery calcifications. 3.  Mild hepatic steatosis. 4.  Additional findings as above.

## 2025-07-28 ENCOUNTER — TELEPHONE (OUTPATIENT)
Dept: INFUSION THERAPY | Age: 70
End: 2025-07-28

## 2025-07-28 DIAGNOSIS — C20 CARCINOMA OF RECTUM (HCC): Primary | ICD-10-CM

## 2025-07-28 NOTE — TELEPHONE ENCOUNTER
Lindy ONEAL w [facility] lvm that pt requests a port or picc line as the frequent sticks are a burden to him.

## 2025-07-29 ENCOUNTER — TELEPHONE (OUTPATIENT)
Dept: INFUSION THERAPY | Age: 70
End: 2025-07-29

## 2025-07-29 NOTE — TELEPHONE ENCOUNTER
Called patient regarding port placement scheduled for 8/8 with 1030 arrival time at [facility] - left message

## 2025-08-05 ENCOUNTER — HOSPITAL ENCOUNTER (OUTPATIENT)
Dept: INFUSION THERAPY | Age: 70
Discharge: HOME | End: 2025-08-05
Attending: INTERNAL MEDICINE
Payer: MEDICARE

## 2025-08-05 ENCOUNTER — OFFICE VISIT (OUTPATIENT)
Dept: ONCOLOGY | Age: 70
End: 2025-08-05
Payer: MEDICARE

## 2025-08-05 VITALS
OXYGEN SATURATION: 100 % | WEIGHT: 152 LBS | SYSTOLIC BLOOD PRESSURE: 100 MMHG | HEART RATE: 92 BPM | DIASTOLIC BLOOD PRESSURE: 59 MMHG | BODY MASS INDEX: 23.04 KG/M2 | TEMPERATURE: 98 F | HEIGHT: 68 IN

## 2025-08-05 DIAGNOSIS — D49.0 RECTAL NEOPLASM: Primary | ICD-10-CM

## 2025-08-05 PROCEDURE — 99214 OFFICE O/P EST MOD 30 MIN: CPT | Performed by: INTERNAL MEDICINE

## 2025-08-05 PROCEDURE — 3017F COLORECTAL CA SCREEN DOC REV: CPT | Performed by: INTERNAL MEDICINE

## 2025-08-05 PROCEDURE — 1126F AMNT PAIN NOTED NONE PRSNT: CPT | Performed by: INTERNAL MEDICINE

## 2025-08-05 PROCEDURE — 1036F TOBACCO NON-USER: CPT | Performed by: INTERNAL MEDICINE

## 2025-08-05 PROCEDURE — G8420 CALC BMI NORM PARAMETERS: HCPCS | Performed by: INTERNAL MEDICINE

## 2025-08-05 PROCEDURE — 1124F ACP DISCUSS-NO DSCNMKR DOCD: CPT | Performed by: INTERNAL MEDICINE

## 2025-08-05 PROCEDURE — G8427 DOCREV CUR MEDS BY ELIG CLIN: HCPCS | Performed by: INTERNAL MEDICINE

## 2025-08-05 PROCEDURE — 99212 OFFICE O/P EST SF 10 MIN: CPT

## 2025-08-05 PROCEDURE — 1159F MED LIST DOCD IN RCRD: CPT | Performed by: INTERNAL MEDICINE

## 2025-08-08 ENCOUNTER — HOSPITAL ENCOUNTER (OUTPATIENT)
Dept: INTERVENTIONAL RADIOLOGY/VASCULAR | Age: 70
Discharge: HOME | End: 2025-08-08
Payer: MEDICARE

## 2025-08-08 VITALS
OXYGEN SATURATION: 98 % | SYSTOLIC BLOOD PRESSURE: 119 MMHG | RESPIRATION RATE: 14 BRPM | DIASTOLIC BLOOD PRESSURE: 77 MMHG | HEART RATE: 81 BPM

## 2025-08-08 DIAGNOSIS — C20 CARCINOMA OF RECTUM (HCC): ICD-10-CM

## 2025-08-08 LAB
INR PPP: 1
PARTIAL THROMBOPLASTIN TIME: 27.1 SEC (ref 25.1–37.1)
PROTHROMBIN TIME: 13.4 SEC (ref 11.7–14.5)

## 2025-08-08 PROCEDURE — C1788 PORT, INDWELLING, IMP: HCPCS

## 2025-08-08 PROCEDURE — 6360000002 HC RX W HCPCS: Performed by: RADIOLOGY

## 2025-08-08 PROCEDURE — 85610 PROTHROMBIN TIME: CPT

## 2025-08-08 PROCEDURE — 2500000003 HC RX 250 WO HCPCS: Performed by: RADIOLOGY

## 2025-08-08 PROCEDURE — 99152 MOD SED SAME PHYS/QHP 5/>YRS: CPT

## 2025-08-08 PROCEDURE — 6360000002 HC RX W HCPCS

## 2025-08-08 PROCEDURE — 76937 US GUIDE VASCULAR ACCESS: CPT

## 2025-08-08 PROCEDURE — 77001 FLUOROGUIDE FOR VEIN DEVICE: CPT

## 2025-08-08 PROCEDURE — 85730 THROMBOPLASTIN TIME PARTIAL: CPT

## 2025-08-08 PROCEDURE — 99153 MOD SED SAME PHYS/QHP EA: CPT

## 2025-08-08 PROCEDURE — 36561 INSERT TUNNELED CV CATH: CPT

## 2025-08-08 RX ORDER — FENTANYL CITRATE 50 UG/ML
INJECTION, SOLUTION INTRAMUSCULAR; INTRAVENOUS PRN
Status: COMPLETED | OUTPATIENT
Start: 2025-08-08 | End: 2025-08-08

## 2025-08-08 RX ORDER — DIPHENHYDRAMINE HYDROCHLORIDE 50 MG/ML
INJECTION, SOLUTION INTRAMUSCULAR; INTRAVENOUS PRN
Status: COMPLETED | OUTPATIENT
Start: 2025-08-08 | End: 2025-08-08

## 2025-08-08 RX ORDER — HEPARIN SODIUM 5000 [USP'U]/ML
INJECTION, SOLUTION INTRAVENOUS; SUBCUTANEOUS PRN
Status: COMPLETED | OUTPATIENT
Start: 2025-08-08 | End: 2025-08-08

## 2025-08-08 RX ORDER — MIDAZOLAM HYDROCHLORIDE 2 MG/2ML
INJECTION, SOLUTION INTRAMUSCULAR; INTRAVENOUS PRN
Status: COMPLETED | OUTPATIENT
Start: 2025-08-08 | End: 2025-08-08

## 2025-08-08 RX ORDER — LIDOCAINE HYDROCHLORIDE 10 MG/ML
INJECTION, SOLUTION EPIDURAL; INFILTRATION; INTRACAUDAL; PERINEURAL PRN
Status: COMPLETED | OUTPATIENT
Start: 2025-08-08 | End: 2025-08-08

## 2025-08-08 RX ORDER — LIDOCAINE HYDROCHLORIDE AND EPINEPHRINE BITARTRATE 20; .01 MG/ML; MG/ML
INJECTION, SOLUTION SUBCUTANEOUS PRN
Status: COMPLETED | OUTPATIENT
Start: 2025-08-08 | End: 2025-08-08

## 2025-08-08 RX ADMIN — LIDOCAINE HYDROCHLORIDE,EPINEPHRINE BITARTRATE 10 ML: 20; .01 INJECTION, SOLUTION INFILTRATION; PERINEURAL at 13:15

## 2025-08-08 RX ADMIN — CEFAZOLIN 2000 MG: 500 INJECTION, POWDER, FOR SOLUTION INTRAMUSCULAR; INTRAVENOUS at 12:53

## 2025-08-08 RX ADMIN — MIDAZOLAM HYDROCHLORIDE 1 MG: 1 INJECTION, SOLUTION INTRAMUSCULAR; INTRAVENOUS at 13:06

## 2025-08-08 RX ADMIN — LIDOCAINE HYDROCHLORIDE 5 ML: 10 INJECTION, SOLUTION EPIDURAL; INFILTRATION; INTRACAUDAL; PERINEURAL at 13:16

## 2025-08-08 RX ADMIN — DIPHENHYDRAMINE HYDROCHLORIDE 25 MG: 50 INJECTION, SOLUTION INTRAMUSCULAR; INTRAVENOUS at 13:07

## 2025-08-08 RX ADMIN — FENTANYL CITRATE 50 MCG: 50 INJECTION, SOLUTION INTRAMUSCULAR; INTRAVENOUS at 13:06

## 2025-08-08 RX ADMIN — HEPARIN SODIUM 500 UNITS: 5000 INJECTION, SOLUTION INTRAVENOUS; SUBCUTANEOUS at 13:25

## 2025-08-08 RX ADMIN — LIDOCAINE HYDROCHLORIDE 5 ML: 10 INJECTION, SOLUTION EPIDURAL; INFILTRATION; INTRACAUDAL; PERINEURAL at 13:09

## 2025-08-09 RX ORDER — DIPHENHYDRAMINE HYDROCHLORIDE 50 MG/ML
50 INJECTION, SOLUTION INTRAMUSCULAR; INTRAVENOUS
Status: CANCELLED | OUTPATIENT
Start: 2025-08-12

## 2025-08-09 RX ORDER — EPINEPHRINE 1 MG/ML
0.3 INJECTION, SOLUTION, CONCENTRATE INTRAVENOUS PRN
Status: CANCELLED | OUTPATIENT
Start: 2025-08-12

## 2025-08-09 RX ORDER — FAMOTIDINE 10 MG/ML
20 INJECTION, SOLUTION INTRAVENOUS
OUTPATIENT
Start: 2025-09-02

## 2025-08-09 RX ORDER — HEPARIN SODIUM (PORCINE) LOCK FLUSH IV SOLN 100 UNIT/ML 100 UNIT/ML
500 SOLUTION INTRAVENOUS PRN
OUTPATIENT
Start: 2025-09-02

## 2025-08-09 RX ORDER — ALBUTEROL SULFATE 90 UG/1
4 INHALANT RESPIRATORY (INHALATION) PRN
Status: CANCELLED | OUTPATIENT
Start: 2025-08-12

## 2025-08-09 RX ORDER — MEPERIDINE HYDROCHLORIDE 50 MG/ML
12.5 INJECTION INTRAMUSCULAR; INTRAVENOUS; SUBCUTANEOUS PRN
Status: CANCELLED | OUTPATIENT
Start: 2025-08-12

## 2025-08-09 RX ORDER — PALONOSETRON 0.05 MG/ML
0.25 INJECTION, SOLUTION INTRAVENOUS ONCE
OUTPATIENT
Start: 2025-09-02 | End: 2025-09-02

## 2025-08-09 RX ORDER — ONDANSETRON 2 MG/ML
8 INJECTION INTRAMUSCULAR; INTRAVENOUS
OUTPATIENT
Start: 2025-09-02

## 2025-08-09 RX ORDER — PALONOSETRON 0.05 MG/ML
0.25 INJECTION, SOLUTION INTRAVENOUS ONCE
Status: CANCELLED | OUTPATIENT
Start: 2025-08-12 | End: 2025-08-12

## 2025-08-09 RX ORDER — SODIUM CHLORIDE 0.9 % (FLUSH) 0.9 %
5-40 SYRINGE (ML) INJECTION PRN
OUTPATIENT
Start: 2025-09-02

## 2025-08-09 RX ORDER — DEXTROSE MONOHYDRATE 50 MG/ML
5-250 INJECTION, SOLUTION INTRAVENOUS PRN
Status: CANCELLED | OUTPATIENT
Start: 2025-08-12

## 2025-08-09 RX ORDER — SODIUM CHLORIDE 9 MG/ML
5-250 INJECTION, SOLUTION INTRAVENOUS PRN
Status: CANCELLED | OUTPATIENT
Start: 2025-08-12

## 2025-08-09 RX ORDER — FAMOTIDINE 10 MG/ML
20 INJECTION, SOLUTION INTRAVENOUS
Status: CANCELLED | OUTPATIENT
Start: 2025-08-12

## 2025-08-09 RX ORDER — HYDROCORTISONE SODIUM SUCCINATE 100 MG/2ML
100 INJECTION INTRAMUSCULAR; INTRAVENOUS
Status: CANCELLED | OUTPATIENT
Start: 2025-08-12

## 2025-08-09 RX ORDER — ACETAMINOPHEN 325 MG/1
650 TABLET ORAL
Status: CANCELLED | OUTPATIENT
Start: 2025-08-12

## 2025-08-09 RX ORDER — ALBUTEROL SULFATE 90 UG/1
4 INHALANT RESPIRATORY (INHALATION) PRN
OUTPATIENT
Start: 2025-09-02

## 2025-08-09 RX ORDER — ONDANSETRON 2 MG/ML
8 INJECTION INTRAMUSCULAR; INTRAVENOUS
Status: CANCELLED | OUTPATIENT
Start: 2025-08-12

## 2025-08-09 RX ORDER — HEPARIN SODIUM (PORCINE) LOCK FLUSH IV SOLN 100 UNIT/ML 100 UNIT/ML
500 SOLUTION INTRAVENOUS PRN
Status: CANCELLED | OUTPATIENT
Start: 2025-08-12

## 2025-08-09 RX ORDER — DIPHENHYDRAMINE HYDROCHLORIDE 50 MG/ML
50 INJECTION, SOLUTION INTRAMUSCULAR; INTRAVENOUS
OUTPATIENT
Start: 2025-09-02

## 2025-08-09 RX ORDER — SODIUM CHLORIDE 0.9 % (FLUSH) 0.9 %
5-40 SYRINGE (ML) INJECTION PRN
Status: CANCELLED | OUTPATIENT
Start: 2025-08-12

## 2025-08-09 RX ORDER — EPINEPHRINE 1 MG/ML
0.3 INJECTION, SOLUTION, CONCENTRATE INTRAVENOUS PRN
OUTPATIENT
Start: 2025-09-02

## 2025-08-09 RX ORDER — SODIUM CHLORIDE 9 MG/ML
5-250 INJECTION, SOLUTION INTRAVENOUS PRN
OUTPATIENT
Start: 2025-09-02

## 2025-08-09 RX ORDER — SODIUM CHLORIDE 9 MG/ML
INJECTION, SOLUTION INTRAVENOUS PRN
OUTPATIENT
Start: 2025-09-02

## 2025-08-09 RX ORDER — MEPERIDINE HYDROCHLORIDE 50 MG/ML
12.5 INJECTION INTRAMUSCULAR; INTRAVENOUS; SUBCUTANEOUS PRN
OUTPATIENT
Start: 2025-09-02

## 2025-08-09 RX ORDER — ACETAMINOPHEN 325 MG/1
650 TABLET ORAL
OUTPATIENT
Start: 2025-09-02

## 2025-08-09 RX ORDER — HYDROCORTISONE SODIUM SUCCINATE 100 MG/2ML
100 INJECTION INTRAMUSCULAR; INTRAVENOUS
OUTPATIENT
Start: 2025-09-02

## 2025-08-09 RX ORDER — DEXTROSE MONOHYDRATE 50 MG/ML
5-250 INJECTION, SOLUTION INTRAVENOUS PRN
OUTPATIENT
Start: 2025-09-02

## 2025-08-09 RX ORDER — SODIUM CHLORIDE 9 MG/ML
INJECTION, SOLUTION INTRAVENOUS PRN
Status: CANCELLED | OUTPATIENT
Start: 2025-08-12

## 2025-08-11 ENCOUNTER — HOSPITAL ENCOUNTER (OUTPATIENT)
Dept: INFUSION THERAPY | Age: 70
Discharge: HOME | End: 2025-08-11
Attending: INTERNAL MEDICINE
Payer: MEDICARE

## 2025-08-11 DIAGNOSIS — D49.0 RECTAL NEOPLASM: ICD-10-CM

## 2025-08-11 LAB
ALBUMIN SERPL-MCNC: 3.4 G/DL (ref 3.4–5)
ALBUMIN/GLOB SERPL: 1 {RATIO} (ref 1.1–2.2)
ALP SERPL-CCNC: 108 U/L (ref 40–129)
ALT SERPL-CCNC: 10 U/L (ref 10–40)
ANION GAP SERPL CALCULATED.3IONS-SCNC: 9 MMOL/L (ref 9–17)
AST SERPL-CCNC: 21 U/L (ref 15–37)
BASOPHILS # BLD: 0.02 K/UL
BASOPHILS NFR BLD: 1 % (ref 0–1)
BILIRUB SERPL-MCNC: 0.8 MG/DL (ref 0–1)
BUN SERPL-MCNC: 13 MG/DL (ref 7–20)
CALCIUM SERPL-MCNC: 8.7 MG/DL (ref 8.3–10.6)
CHLORIDE SERPL-SCNC: 100 MMOL/L (ref 99–110)
CO2 SERPL-SCNC: 28 MMOL/L (ref 21–32)
CREAT SERPL-MCNC: 0.6 MG/DL (ref 0.8–1.3)
EOSINOPHIL # BLD: 0.12 K/UL
EOSINOPHILS RELATIVE PERCENT: 3 % (ref 0–3)
ERYTHROCYTE [DISTWIDTH] IN BLOOD BY AUTOMATED COUNT: 21.8 % (ref 11.7–14.9)
GFR, ESTIMATED: >90 ML/MIN/1.73M2
GLUCOSE SERPL-MCNC: 99 MG/DL (ref 74–99)
HCT VFR BLD AUTO: 36.2 % (ref 42–52)
HGB BLD-MCNC: 12.1 G/DL (ref 13.5–18)
LYMPHOCYTES NFR BLD: 0.79 K/UL
LYMPHOCYTES RELATIVE PERCENT: 21 % (ref 24–44)
MCH RBC QN AUTO: 33.9 PG (ref 27–31)
MCHC RBC AUTO-ENTMCNC: 33.4 G/DL (ref 32–36)
MCV RBC AUTO: 101.4 FL (ref 78–100)
MONOCYTES NFR BLD: 0.66 K/UL
MONOCYTES NFR BLD: 18 % (ref 0–5)
NEUTROPHILS NFR BLD: 58 % (ref 36–66)
NEUTS SEG NFR BLD: 2.15 K/UL
PLATELET # BLD AUTO: 182 K/UL (ref 140–440)
PMV BLD AUTO: 8.8 FL (ref 7.5–11.1)
POTASSIUM SERPL-SCNC: 3.9 MMOL/L (ref 3.5–5.1)
PROT SERPL-MCNC: 6.7 G/DL (ref 6.4–8.2)
RBC # BLD AUTO: 3.57 M/UL (ref 4.6–6.2)
SODIUM SERPL-SCNC: 137 MMOL/L (ref 136–145)
WBC OTHER # BLD: 3.7 K/UL (ref 4–10.5)

## 2025-08-11 PROCEDURE — 36415 COLL VENOUS BLD VENIPUNCTURE: CPT

## 2025-08-11 PROCEDURE — 80053 COMPREHEN METABOLIC PANEL: CPT

## 2025-08-11 PROCEDURE — 85025 COMPLETE CBC W/AUTO DIFF WBC: CPT

## 2025-08-12 ENCOUNTER — HOSPITAL ENCOUNTER (OUTPATIENT)
Dept: INFUSION THERAPY | Age: 70
Discharge: HOME | End: 2025-08-12
Attending: INTERNAL MEDICINE
Payer: MEDICARE

## 2025-08-12 VITALS
HEART RATE: 101 BPM | BODY MASS INDEX: 22.88 KG/M2 | DIASTOLIC BLOOD PRESSURE: 75 MMHG | SYSTOLIC BLOOD PRESSURE: 121 MMHG | RESPIRATION RATE: 16 BRPM | TEMPERATURE: 97.8 F | WEIGHT: 151 LBS | OXYGEN SATURATION: 95 % | HEIGHT: 68 IN

## 2025-08-12 DIAGNOSIS — D49.0 RECTAL NEOPLASM: Primary | ICD-10-CM

## 2025-08-12 PROCEDURE — 2500000003 HC RX 250 WO HCPCS: Performed by: INTERNAL MEDICINE

## 2025-08-12 PROCEDURE — 96415 CHEMO IV INFUSION ADDL HR: CPT

## 2025-08-12 PROCEDURE — 96413 CHEMO IV INFUSION 1 HR: CPT

## 2025-08-12 PROCEDURE — 6360000002 HC RX W HCPCS: Performed by: INTERNAL MEDICINE

## 2025-08-12 PROCEDURE — 2580000003 HC RX 258: Performed by: INTERNAL MEDICINE

## 2025-08-12 PROCEDURE — 96375 TX/PRO/DX INJ NEW DRUG ADDON: CPT

## 2025-08-12 RX ORDER — DEXTROSE MONOHYDRATE 50 MG/ML
5-250 INJECTION, SOLUTION INTRAVENOUS PRN
Status: DISCONTINUED | OUTPATIENT
Start: 2025-08-12 | End: 2025-08-13 | Stop reason: HOSPADM

## 2025-08-12 RX ORDER — SODIUM CHLORIDE 0.9 % (FLUSH) 0.9 %
5-40 SYRINGE (ML) INJECTION PRN
Status: DISCONTINUED | OUTPATIENT
Start: 2025-08-12 | End: 2025-08-13 | Stop reason: HOSPADM

## 2025-08-12 RX ORDER — PALONOSETRON 0.05 MG/ML
0.25 INJECTION, SOLUTION INTRAVENOUS ONCE
Status: COMPLETED | OUTPATIENT
Start: 2025-08-12 | End: 2025-08-12

## 2025-08-12 RX ADMIN — DEXAMETHASONE SODIUM PHOSPHATE 12 MG: 4 INJECTION, SOLUTION INTRAMUSCULAR; INTRAVENOUS at 11:23

## 2025-08-12 RX ADMIN — PALONOSETRON 0.25 MG: 0.25 INJECTION, SOLUTION INTRAVENOUS at 11:20

## 2025-08-12 RX ADMIN — DEXTROSE 20 ML/HR: 5 SOLUTION INTRAVENOUS at 11:46

## 2025-08-12 RX ADMIN — OXALIPLATIN 235 MG: 5 INJECTION, SOLUTION INTRAVENOUS at 11:45

## 2025-08-12 RX ADMIN — SODIUM CHLORIDE, PRESERVATIVE FREE 20 ML: 5 INJECTION INTRAVENOUS at 13:56

## 2025-08-29 ENCOUNTER — HOSPITAL ENCOUNTER (OUTPATIENT)
Dept: INFUSION THERAPY | Age: 70
Discharge: HOME | End: 2025-08-29
Attending: INTERNAL MEDICINE
Payer: MEDICARE

## 2025-08-29 DIAGNOSIS — D49.0 RECTAL NEOPLASM: ICD-10-CM

## 2025-08-29 LAB
ALBUMIN SERPL-MCNC: 3.3 G/DL (ref 3.4–5)
ALBUMIN/GLOB SERPL: 1 {RATIO} (ref 1.1–2.2)
ALP SERPL-CCNC: 114 U/L (ref 40–129)
ALT SERPL-CCNC: 12 U/L (ref 10–40)
ANION GAP SERPL CALCULATED.3IONS-SCNC: 14 MMOL/L (ref 9–17)
AST SERPL-CCNC: 30 U/L (ref 15–37)
BASOPHILS # BLD: 0.02 K/UL
BASOPHILS NFR BLD: 1 % (ref 0–1)
BILIRUB SERPL-MCNC: 0.5 MG/DL (ref 0–1)
BUN SERPL-MCNC: 14 MG/DL (ref 7–20)
CALCIUM SERPL-MCNC: 8.7 MG/DL (ref 8.3–10.6)
CHLORIDE SERPL-SCNC: 103 MMOL/L (ref 99–110)
CO2 SERPL-SCNC: 22 MMOL/L (ref 21–32)
CREAT SERPL-MCNC: 0.7 MG/DL (ref 0.8–1.3)
EOSINOPHIL # BLD: 0.09 K/UL
EOSINOPHILS RELATIVE PERCENT: 2 % (ref 0–3)
ERYTHROCYTE [DISTWIDTH] IN BLOOD BY AUTOMATED COUNT: 20.4 % (ref 11.7–14.9)
GFR, ESTIMATED: >90 ML/MIN/1.73M2
GLUCOSE SERPL-MCNC: 98 MG/DL (ref 74–99)
HCT VFR BLD AUTO: 37.7 % (ref 42–52)
HGB BLD-MCNC: 12.1 G/DL (ref 13.5–18)
LYMPHOCYTES NFR BLD: 0.89 K/UL
LYMPHOCYTES RELATIVE PERCENT: 23 % (ref 24–44)
MCH RBC QN AUTO: 34 PG (ref 27–31)
MCHC RBC AUTO-ENTMCNC: 32.1 G/DL (ref 32–36)
MCV RBC AUTO: 105.9 FL (ref 78–100)
MONOCYTES NFR BLD: 0.65 K/UL
MONOCYTES NFR BLD: 17 % (ref 0–5)
NEUTROPHILS NFR BLD: 57 % (ref 36–66)
NEUTS SEG NFR BLD: 2.22 K/UL
PLATELET # BLD AUTO: 165 K/UL (ref 140–440)
PMV BLD AUTO: 9.4 FL (ref 7.5–11.1)
POTASSIUM SERPL-SCNC: 4.2 MMOL/L (ref 3.5–5.1)
PROT SERPL-MCNC: 6.6 G/DL (ref 6.4–8.2)
RBC # BLD AUTO: 3.56 M/UL (ref 4.6–6.2)
SODIUM SERPL-SCNC: 139 MMOL/L (ref 136–145)
WBC OTHER # BLD: 3.9 K/UL (ref 4–10.5)

## 2025-08-29 PROCEDURE — 85025 COMPLETE CBC W/AUTO DIFF WBC: CPT

## 2025-08-29 PROCEDURE — 36415 COLL VENOUS BLD VENIPUNCTURE: CPT

## 2025-08-29 PROCEDURE — 80053 COMPREHEN METABOLIC PANEL: CPT

## 2025-09-02 ENCOUNTER — HOSPITAL ENCOUNTER (OUTPATIENT)
Dept: INFUSION THERAPY | Age: 70
Discharge: HOME | End: 2025-09-02
Attending: INTERNAL MEDICINE
Payer: MEDICARE

## 2025-09-02 ENCOUNTER — OFFICE VISIT (OUTPATIENT)
Dept: ONCOLOGY | Age: 70
End: 2025-09-02
Payer: MEDICARE

## 2025-09-02 VITALS
DIASTOLIC BLOOD PRESSURE: 73 MMHG | SYSTOLIC BLOOD PRESSURE: 93 MMHG | TEMPERATURE: 97.8 F | WEIGHT: 147.6 LBS | OXYGEN SATURATION: 98 % | HEART RATE: 90 BPM | BODY MASS INDEX: 22.37 KG/M2 | HEIGHT: 68 IN

## 2025-09-02 VITALS
SYSTOLIC BLOOD PRESSURE: 93 MMHG | WEIGHT: 147.6 LBS | HEART RATE: 90 BPM | HEIGHT: 68 IN | OXYGEN SATURATION: 98 % | TEMPERATURE: 97.8 F | BODY MASS INDEX: 22.37 KG/M2 | RESPIRATION RATE: 15 BRPM | DIASTOLIC BLOOD PRESSURE: 73 MMHG

## 2025-09-02 DIAGNOSIS — D49.0 RECTAL NEOPLASM: Primary | ICD-10-CM

## 2025-09-02 PROCEDURE — 96413 CHEMO IV INFUSION 1 HR: CPT

## 2025-09-02 PROCEDURE — 1159F MED LIST DOCD IN RCRD: CPT | Performed by: INTERNAL MEDICINE

## 2025-09-02 PROCEDURE — 99214 OFFICE O/P EST MOD 30 MIN: CPT | Performed by: INTERNAL MEDICINE

## 2025-09-02 PROCEDURE — 2500000003 HC RX 250 WO HCPCS: Performed by: INTERNAL MEDICINE

## 2025-09-02 PROCEDURE — 96415 CHEMO IV INFUSION ADDL HR: CPT

## 2025-09-02 PROCEDURE — G8427 DOCREV CUR MEDS BY ELIG CLIN: HCPCS | Performed by: INTERNAL MEDICINE

## 2025-09-02 PROCEDURE — 96367 TX/PROPH/DG ADDL SEQ IV INF: CPT

## 2025-09-02 PROCEDURE — 2580000003 HC RX 258: Performed by: INTERNAL MEDICINE

## 2025-09-02 PROCEDURE — 6360000002 HC RX W HCPCS: Performed by: INTERNAL MEDICINE

## 2025-09-02 PROCEDURE — 1126F AMNT PAIN NOTED NONE PRSNT: CPT | Performed by: INTERNAL MEDICINE

## 2025-09-02 PROCEDURE — 3017F COLORECTAL CA SCREEN DOC REV: CPT | Performed by: INTERNAL MEDICINE

## 2025-09-02 PROCEDURE — G8420 CALC BMI NORM PARAMETERS: HCPCS | Performed by: INTERNAL MEDICINE

## 2025-09-02 PROCEDURE — 96375 TX/PRO/DX INJ NEW DRUG ADDON: CPT

## 2025-09-02 PROCEDURE — 1124F ACP DISCUSS-NO DSCNMKR DOCD: CPT | Performed by: INTERNAL MEDICINE

## 2025-09-02 PROCEDURE — 1036F TOBACCO NON-USER: CPT | Performed by: INTERNAL MEDICINE

## 2025-09-02 RX ORDER — MEPERIDINE HYDROCHLORIDE 50 MG/ML
12.5 INJECTION INTRAMUSCULAR; INTRAVENOUS; SUBCUTANEOUS PRN
OUTPATIENT
Start: 2025-09-23

## 2025-09-02 RX ORDER — HYDROCORTISONE SODIUM SUCCINATE 100 MG/2ML
100 INJECTION INTRAMUSCULAR; INTRAVENOUS
OUTPATIENT
Start: 2025-09-23

## 2025-09-02 RX ORDER — DIPHENHYDRAMINE HYDROCHLORIDE 50 MG/ML
50 INJECTION, SOLUTION INTRAMUSCULAR; INTRAVENOUS
OUTPATIENT
Start: 2025-09-23

## 2025-09-02 RX ORDER — PALONOSETRON 0.05 MG/ML
0.25 INJECTION, SOLUTION INTRAVENOUS ONCE
OUTPATIENT
Start: 2025-09-23 | End: 2025-09-23

## 2025-09-02 RX ORDER — EPINEPHRINE 1 MG/ML
0.3 INJECTION, SOLUTION, CONCENTRATE INTRAVENOUS PRN
OUTPATIENT
Start: 2025-09-23

## 2025-09-02 RX ORDER — ACETAMINOPHEN 325 MG/1
650 TABLET ORAL
OUTPATIENT
Start: 2025-09-23

## 2025-09-02 RX ORDER — SODIUM CHLORIDE 0.9 % (FLUSH) 0.9 %
5-40 SYRINGE (ML) INJECTION PRN
OUTPATIENT
Start: 2025-09-23

## 2025-09-02 RX ORDER — SODIUM CHLORIDE 0.9 % (FLUSH) 0.9 %
5-40 SYRINGE (ML) INJECTION PRN
Status: DISCONTINUED | OUTPATIENT
Start: 2025-09-02 | End: 2025-09-03 | Stop reason: HOSPADM

## 2025-09-02 RX ORDER — PALONOSETRON 0.05 MG/ML
0.25 INJECTION, SOLUTION INTRAVENOUS ONCE
Status: COMPLETED | OUTPATIENT
Start: 2025-09-02 | End: 2025-09-02

## 2025-09-02 RX ORDER — HEPARIN SODIUM (PORCINE) LOCK FLUSH IV SOLN 100 UNIT/ML 100 UNIT/ML
500 SOLUTION INTRAVENOUS PRN
OUTPATIENT
Start: 2025-09-23

## 2025-09-02 RX ORDER — ONDANSETRON 2 MG/ML
8 INJECTION INTRAMUSCULAR; INTRAVENOUS
OUTPATIENT
Start: 2025-09-23

## 2025-09-02 RX ORDER — SODIUM CHLORIDE 9 MG/ML
5-250 INJECTION, SOLUTION INTRAVENOUS PRN
OUTPATIENT
Start: 2025-09-23

## 2025-09-02 RX ORDER — DEXTROSE MONOHYDRATE 50 MG/ML
5-250 INJECTION, SOLUTION INTRAVENOUS PRN
OUTPATIENT
Start: 2025-09-23

## 2025-09-02 RX ORDER — DEXTROSE MONOHYDRATE 50 MG/ML
5-250 INJECTION, SOLUTION INTRAVENOUS PRN
Status: DISCONTINUED | OUTPATIENT
Start: 2025-09-02 | End: 2025-09-03 | Stop reason: HOSPADM

## 2025-09-02 RX ORDER — ALBUTEROL SULFATE 90 UG/1
4 INHALANT RESPIRATORY (INHALATION) PRN
OUTPATIENT
Start: 2025-09-23

## 2025-09-02 RX ORDER — FAMOTIDINE 10 MG/ML
20 INJECTION, SOLUTION INTRAVENOUS
OUTPATIENT
Start: 2025-09-23

## 2025-09-02 RX ORDER — SODIUM CHLORIDE 9 MG/ML
INJECTION, SOLUTION INTRAVENOUS PRN
OUTPATIENT
Start: 2025-09-23

## 2025-09-02 RX ADMIN — DEXAMETHASONE SODIUM PHOSPHATE 12 MG: 4 INJECTION, SOLUTION INTRAMUSCULAR; INTRAVENOUS at 12:18

## 2025-09-02 RX ADMIN — DEXTROSE 20 ML/HR: 50 INJECTION, SOLUTION INTRAVENOUS at 12:17

## 2025-09-02 RX ADMIN — SODIUM CHLORIDE, PRESERVATIVE FREE 20 ML: 5 INJECTION INTRAVENOUS at 15:01

## 2025-09-02 RX ADMIN — OXALIPLATIN 235 MG: 5 INJECTION, SOLUTION INTRAVENOUS at 12:44

## 2025-09-02 RX ADMIN — PALONOSETRON 0.25 MG: 0.25 INJECTION, SOLUTION INTRAVENOUS at 12:17

## 2025-09-02 ASSESSMENT — PAIN SCALES - GENERAL: PAINLEVEL_OUTOF10: 0

## (undated) DEVICE — SNARE ENDOSCP CLD 230 CM 10 MM 2.3 MM ROTATABLE LESIONHUNTER

## (undated) DEVICE — FORCEPS BX 240CM JAW 3.2MM L CAP NDL MIC MESH TTH M00513372

## (undated) DEVICE — ENDOSCOPIC KIT 1.1+ OP4 CA DE 2 GWN AAMI LEVEL 3

## (undated) DEVICE — FORCEPS BX L240CM JAW DIA2.8MM L CAP W/ NDL MIC MESH TOOTH